# Patient Record
Sex: FEMALE | Race: WHITE | NOT HISPANIC OR LATINO | Employment: OTHER | URBAN - NONMETROPOLITAN AREA
[De-identification: names, ages, dates, MRNs, and addresses within clinical notes are randomized per-mention and may not be internally consistent; named-entity substitution may affect disease eponyms.]

---

## 2019-11-27 ENCOUNTER — APPOINTMENT (INPATIENT)
Dept: NON INVASIVE DIAGNOSTICS | Facility: HOSPITAL | Age: 64
DRG: 064 | End: 2019-11-27
Payer: MEDICARE

## 2019-11-27 ENCOUNTER — APPOINTMENT (INPATIENT)
Dept: ULTRASOUND IMAGING | Facility: HOSPITAL | Age: 64
DRG: 064 | End: 2019-11-27
Payer: MEDICARE

## 2019-11-27 ENCOUNTER — APPOINTMENT (INPATIENT)
Dept: MRI IMAGING | Facility: HOSPITAL | Age: 64
DRG: 064 | End: 2019-11-27
Payer: MEDICARE

## 2019-11-27 ENCOUNTER — APPOINTMENT (EMERGENCY)
Dept: CT IMAGING | Facility: HOSPITAL | Age: 64
DRG: 064 | End: 2019-11-27
Payer: MEDICARE

## 2019-11-27 ENCOUNTER — APPOINTMENT (EMERGENCY)
Dept: RADIOLOGY | Facility: HOSPITAL | Age: 64
DRG: 064 | End: 2019-11-27
Payer: MEDICARE

## 2019-11-27 ENCOUNTER — HOSPITAL ENCOUNTER (INPATIENT)
Facility: HOSPITAL | Age: 64
LOS: 3 days | DRG: 064 | End: 2019-11-30
Attending: EMERGENCY MEDICINE | Admitting: INTERNAL MEDICINE
Payer: MEDICARE

## 2019-11-27 ENCOUNTER — APPOINTMENT (INPATIENT)
Dept: CT IMAGING | Facility: HOSPITAL | Age: 64
DRG: 064 | End: 2019-11-27
Payer: MEDICARE

## 2019-11-27 DIAGNOSIS — I63.9 CVA (CEREBRAL VASCULAR ACCIDENT) (HCC): Primary | ICD-10-CM

## 2019-11-27 DIAGNOSIS — F17.200 SMOKER: ICD-10-CM

## 2019-11-27 DIAGNOSIS — C79.9 MULTIPLE LESIONS OF METASTATIC MALIGNANCY (HCC): ICD-10-CM

## 2019-11-27 DIAGNOSIS — M62.82 RHABDOMYOLYSIS: ICD-10-CM

## 2019-11-27 DIAGNOSIS — N17.9 ACUTE RENAL FAILURE (ARF) (HCC): ICD-10-CM

## 2019-11-27 PROBLEM — R09.02 HYPOXIA: Status: ACTIVE | Noted: 2019-11-27

## 2019-11-27 PROBLEM — R74.01 ELEVATED TRANSAMINASE LEVEL: Status: ACTIVE | Noted: 2019-11-27

## 2019-11-27 LAB
ALBUMIN SERPL BCP-MCNC: 3.5 G/DL (ref 3.5–5)
ALP SERPL-CCNC: 184 U/L (ref 46–116)
ALT SERPL W P-5'-P-CCNC: 2733 U/L (ref 12–78)
ANION GAP SERPL CALCULATED.3IONS-SCNC: 13 MMOL/L (ref 4–13)
APTT PPP: 32 SECONDS (ref 23–37)
AST SERPL W P-5'-P-CCNC: 7506 U/L (ref 5–45)
ATRIAL RATE: 111 BPM
BASOPHILS # BLD MANUAL: 0 THOUSAND/UL (ref 0–0.1)
BASOPHILS NFR MAR MANUAL: 0 % (ref 0–1)
BILIRUB SERPL-MCNC: 2.17 MG/DL (ref 0.2–1)
BUN SERPL-MCNC: 73 MG/DL (ref 5–25)
CALCIUM SERPL-MCNC: 7.9 MG/DL (ref 8.3–10.1)
CHLORIDE SERPL-SCNC: 102 MMOL/L (ref 100–108)
CK MB SERPL-MCNC: 1.3 % (ref 0–2.5)
CK MB SERPL-MCNC: 67 NG/ML (ref 0–5)
CK SERPL-CCNC: 5165 U/L (ref 26–192)
CO2 SERPL-SCNC: 22 MMOL/L (ref 21–32)
CREAT SERPL-MCNC: 1.69 MG/DL (ref 0.6–1.3)
EOSINOPHIL # BLD MANUAL: 0 THOUSAND/UL (ref 0–0.4)
EOSINOPHIL NFR BLD MANUAL: 0 % (ref 0–6)
ERYTHROCYTE [DISTWIDTH] IN BLOOD BY AUTOMATED COUNT: 13.6 % (ref 11.6–15.1)
GFR SERPL CREATININE-BSD FRML MDRD: 31 ML/MIN/1.73SQ M
GIANT PLATELETS BLD QL SMEAR: PRESENT
GLUCOSE SERPL-MCNC: 123 MG/DL (ref 65–140)
GLUCOSE SERPL-MCNC: 228 MG/DL (ref 65–140)
GLUCOSE SERPL-MCNC: 315 MG/DL (ref 65–140)
GLUCOSE SERPL-MCNC: 352 MG/DL (ref 65–140)
GLUCOSE SERPL-MCNC: <20 MG/DL (ref 65–140)
GLUCOSE SERPL-MCNC: >500 MG/DL (ref 65–140)
GLUCOSE SERPL-MCNC: >500 MG/DL (ref 65–140)
HCT VFR BLD AUTO: 35.3 % (ref 34.8–46.1)
HGB BLD-MCNC: 11 G/DL (ref 11.5–15.4)
INR PPP: 2.7 (ref 0.84–1.19)
LACTATE SERPL-SCNC: 2 MMOL/L (ref 0.5–2)
LACTATE SERPL-SCNC: 3.5 MMOL/L (ref 0.5–2)
LG PLATELETS BLD QL SMEAR: PRESENT
LYMPHOCYTES # BLD AUTO: 1.26 THOUSAND/UL (ref 0.6–4.47)
LYMPHOCYTES # BLD AUTO: 9 % (ref 14–44)
MCH RBC QN AUTO: 30.2 PG (ref 26.8–34.3)
MCHC RBC AUTO-ENTMCNC: 31.2 G/DL (ref 31.4–37.4)
MCV RBC AUTO: 97 FL (ref 82–98)
MONOCYTES # BLD AUTO: 0.42 THOUSAND/UL (ref 0–1.22)
MONOCYTES NFR BLD: 3 % (ref 4–12)
NEUTROPHILS # BLD MANUAL: 12.33 THOUSAND/UL (ref 1.85–7.62)
NEUTS BAND NFR BLD MANUAL: 6 % (ref 0–8)
NEUTS SEG NFR BLD AUTO: 82 % (ref 43–75)
NRBC BLD AUTO-RTO: 0 /100 WBCS
P AXIS: 69 DEGREES
PHOSPHATE SERPL-MCNC: 3.3 MG/DL (ref 2.3–4.1)
PLATELET # BLD AUTO: 49 THOUSANDS/UL (ref 149–390)
PLATELET # BLD AUTO: 62 THOUSANDS/UL (ref 149–390)
PLATELET BLD QL SMEAR: ABNORMAL
PMV BLD AUTO: 13.3 FL (ref 8.9–12.7)
POIKILOCYTOSIS BLD QL SMEAR: PRESENT
POLYCHROMASIA BLD QL SMEAR: PRESENT
POTASSIUM SERPL-SCNC: 5.1 MMOL/L (ref 3.5–5.3)
PR INTERVAL: 130 MS
PROT SERPL-MCNC: 6.7 G/DL (ref 6.4–8.2)
PROTHROMBIN TIME: 29.1 SECONDS (ref 11.6–14.5)
QRS AXIS: 24 DEGREES
QRSD INTERVAL: 80 MS
QT INTERVAL: 338 MS
QTC INTERVAL: 459 MS
RBC # BLD AUTO: 3.64 MILLION/UL (ref 3.81–5.12)
SODIUM SERPL-SCNC: 137 MMOL/L (ref 136–145)
T WAVE AXIS: 3 DEGREES
TOTAL CELLS COUNTED SPEC: 100
VENTRICULAR RATE: 111 BPM
WBC # BLD AUTO: 14.01 THOUSAND/UL (ref 4.31–10.16)

## 2019-11-27 PROCEDURE — 82550 ASSAY OF CK (CPK): CPT | Performed by: EMERGENCY MEDICINE

## 2019-11-27 PROCEDURE — 36415 COLL VENOUS BLD VENIPUNCTURE: CPT | Performed by: EMERGENCY MEDICINE

## 2019-11-27 PROCEDURE — 71045 X-RAY EXAM CHEST 1 VIEW: CPT

## 2019-11-27 PROCEDURE — 82948 REAGENT STRIP/BLOOD GLUCOSE: CPT

## 2019-11-27 PROCEDURE — 83605 ASSAY OF LACTIC ACID: CPT | Performed by: INTERNAL MEDICINE

## 2019-11-27 PROCEDURE — 92610 EVALUATE SWALLOWING FUNCTION: CPT

## 2019-11-27 PROCEDURE — 93005 ELECTROCARDIOGRAM TRACING: CPT

## 2019-11-27 PROCEDURE — 70551 MRI BRAIN STEM W/O DYE: CPT

## 2019-11-27 PROCEDURE — 99291 CRITICAL CARE FIRST HOUR: CPT | Performed by: EMERGENCY MEDICINE

## 2019-11-27 PROCEDURE — 96360 HYDRATION IV INFUSION INIT: CPT

## 2019-11-27 PROCEDURE — 85027 COMPLETE CBC AUTOMATED: CPT | Performed by: EMERGENCY MEDICINE

## 2019-11-27 PROCEDURE — 84100 ASSAY OF PHOSPHORUS: CPT | Performed by: INTERNAL MEDICINE

## 2019-11-27 PROCEDURE — 85049 AUTOMATED PLATELET COUNT: CPT | Performed by: INTERNAL MEDICINE

## 2019-11-27 PROCEDURE — 99223 1ST HOSP IP/OBS HIGH 75: CPT | Performed by: INTERNAL MEDICINE

## 2019-11-27 PROCEDURE — 83605 ASSAY OF LACTIC ACID: CPT | Performed by: EMERGENCY MEDICINE

## 2019-11-27 PROCEDURE — 85730 THROMBOPLASTIN TIME PARTIAL: CPT | Performed by: EMERGENCY MEDICINE

## 2019-11-27 PROCEDURE — 70544 MR ANGIOGRAPHY HEAD W/O DYE: CPT

## 2019-11-27 PROCEDURE — 82553 CREATINE MB FRACTION: CPT | Performed by: EMERGENCY MEDICINE

## 2019-11-27 PROCEDURE — 99285 EMERGENCY DEPT VISIT HI MDM: CPT

## 2019-11-27 PROCEDURE — 85007 BL SMEAR W/DIFF WBC COUNT: CPT | Performed by: EMERGENCY MEDICINE

## 2019-11-27 PROCEDURE — 71250 CT THORAX DX C-: CPT

## 2019-11-27 PROCEDURE — G0425 INPT/ED TELECONSULT30: HCPCS | Performed by: PSYCHIATRY & NEUROLOGY

## 2019-11-27 PROCEDURE — 85610 PROTHROMBIN TIME: CPT | Performed by: EMERGENCY MEDICINE

## 2019-11-27 PROCEDURE — 80053 COMPREHEN METABOLIC PANEL: CPT | Performed by: EMERGENCY MEDICINE

## 2019-11-27 PROCEDURE — 70450 CT HEAD/BRAIN W/O DYE: CPT

## 2019-11-27 PROCEDURE — 76705 ECHO EXAM OF ABDOMEN: CPT

## 2019-11-27 RX ORDER — ATORVASTATIN CALCIUM 40 MG/1
40 TABLET, FILM COATED ORAL EVERY EVENING
Status: DISCONTINUED | OUTPATIENT
Start: 2019-11-27 | End: 2019-11-29

## 2019-11-27 RX ORDER — CEFTRIAXONE 1 G/50ML
1000 INJECTION, SOLUTION INTRAVENOUS EVERY 24 HOURS
Status: DISCONTINUED | OUTPATIENT
Start: 2019-11-27 | End: 2019-11-29

## 2019-11-27 RX ORDER — NICOTINE 21 MG/24HR
1 PATCH, TRANSDERMAL 24 HOURS TRANSDERMAL DAILY
Status: DISCONTINUED | OUTPATIENT
Start: 2019-11-27 | End: 2019-11-30 | Stop reason: HOSPADM

## 2019-11-27 RX ORDER — DEXTROSE 50 % IN WATER 50 %
1 SYRINGE (ML) INTRAVENOUS ONCE
Status: COMPLETED | OUTPATIENT
Start: 2019-11-27 | End: 2019-11-27

## 2019-11-27 RX ORDER — ASPIRIN 81 MG/1
81 TABLET, CHEWABLE ORAL DAILY
Status: DISCONTINUED | OUTPATIENT
Start: 2019-11-27 | End: 2019-11-28

## 2019-11-27 RX ORDER — INSULIN GLARGINE 100 [IU]/ML
10 INJECTION, SOLUTION SUBCUTANEOUS
Status: DISCONTINUED | OUTPATIENT
Start: 2019-11-27 | End: 2019-11-27

## 2019-11-27 RX ORDER — SODIUM CHLORIDE 9 MG/ML
50 INJECTION, SOLUTION INTRAVENOUS CONTINUOUS
Status: DISCONTINUED | OUTPATIENT
Start: 2019-11-27 | End: 2019-11-28

## 2019-11-27 RX ADMIN — INSULIN LISPRO 2 UNITS: 100 INJECTION, SOLUTION INTRAVENOUS; SUBCUTANEOUS at 18:16

## 2019-11-27 RX ADMIN — SODIUM CHLORIDE 1000 ML: 0.9 INJECTION, SOLUTION INTRAVENOUS at 09:35

## 2019-11-27 RX ADMIN — CEFTRIAXONE 1000 MG: 1 INJECTION, SOLUTION INTRAVENOUS at 18:10

## 2019-11-27 RX ADMIN — ENOXAPARIN SODIUM 40 MG: 40 INJECTION SUBCUTANEOUS at 15:15

## 2019-11-27 RX ADMIN — SODIUM CHLORIDE 125 ML/HR: 0.9 INJECTION, SOLUTION INTRAVENOUS at 17:30

## 2019-11-27 NOTE — ASSESSMENT & PLAN NOTE
Patient on the ground for unknown duration  Follow CK level, serum electrolytes, renal function  Continue normal saline at 125 ml/hr

## 2019-11-27 NOTE — ED PROVIDER NOTES
History  Chief Complaint   Patient presents with    Altered Mental Status     pt found sitting on br floor juat PTA, garbled speech, left facial droop unequal strength hand grasp, weak on left, right sided gaze, incont urine  Pt has hx of cold and cyanotic hands and feet all the time  last seen well on Monday am, today is Wed  Patient was last known normal Sunday night per boyfriend  Boyfriend found her today slumped over on the toilet  Noted to have a facial droop questionable right-sided gaze and left-sided weakness  Blood sugar was read as 23 was given D50  Slightly improved mental status although the facial droop and left-sided weakness continued  Patient unable to give a great history  Patient's boyfriend is now here  He states that the patient complained not feeling well during the weekend  Was tired and cold symptoms  Last seen normal was Monday morning  10AM:       History provided by:  EMS personnel  History limited by:  Acuity of condition   used: No    Altered Mental Status   Presenting symptoms: confusion, lethargy and partial responsiveness    Severity:  Moderate  Most recent episode:  More than 2 days ago  Episode history:  Single  Timing:  Constant  Progression:  Waxing and waning  Associated symptoms: no abdominal pain, no fever, no hallucinations, no headaches, no nausea, no palpitations, no rash, no seizures and no vomiting        Prior to Admission Medications   Prescriptions Last Dose Informant Patient Reported? Taking? Multiple Vitamin (MULTIVITAMIN) tablet 11/26/2019 at Unknown time  Yes Yes   Sig: Take 1 tablet by mouth daily  aspirin (ECOTRIN) 325 mg EC tablet 11/26/2019 at Unknown time  Yes Yes   Sig: Take 325 mg by mouth daily  Facility-Administered Medications: None       History reviewed  No pertinent past medical history  History reviewed  No pertinent surgical history  History reviewed  No pertinent family history    I have reviewed and agree with the history as documented  Social History     Tobacco Use    Smoking status: Current Every Day Smoker    Smokeless tobacco: Never Used   Substance Use Topics    Alcohol use: Yes     Comment: ocassional    Drug use: No        Review of Systems   Constitutional: Negative for chills and fever  HENT: Negative for ear pain, hearing loss, sore throat, trouble swallowing and voice change  Eyes: Negative for pain and discharge  Respiratory: Negative for cough, shortness of breath and wheezing  Cardiovascular: Negative for chest pain and palpitations  Gastrointestinal: Negative for abdominal pain, blood in stool, constipation, diarrhea, nausea and vomiting  Genitourinary: Negative for dysuria, flank pain, frequency and hematuria  Musculoskeletal: Negative for joint swelling, neck pain and neck stiffness  Skin: Negative for rash and wound  Neurological: Negative for dizziness, seizures, syncope, facial asymmetry and headaches  Psychiatric/Behavioral: Positive for confusion  Negative for hallucinations, self-injury and suicidal ideas  All other systems reviewed and are negative  Physical Exam  Physical Exam   Constitutional: She appears well-developed and well-nourished  No distress  HENT:   Head: Normocephalic and atraumatic  Right Ear: External ear normal    Left Ear: External ear normal    Eyes: Pupils are equal, round, and reactive to light  Conjunctivae are normal    Eye slightly deviated to the right  Neck: Normal range of motion  Neck supple  Cardiovascular: Normal rate, regular rhythm and normal heart sounds  No murmur heard  Pulmonary/Chest: Effort normal and breath sounds normal  She has no wheezes  She has no rales  Abdominal: Soft  Bowel sounds are normal  She exhibits no distension  There is no tenderness  There is no rebound and no guarding  Musculoskeletal: Normal range of motion  She exhibits no deformity  Neurological: She is alert     Response to tactile stimuli  Not really answering questions  Left facial droop noted  Eyes slightly deviated to the right  Moves left side but does not follow commands  Does appear weak on the left compared to the right  Skin: Skin is warm and dry  No rash noted  Psychiatric: She has a normal mood and affect  Her behavior is normal    Nursing note and vitals reviewed  Vital Signs  ED Triage Vitals [11/27/19 0925]   Temperature Pulse Respirations Blood Pressure SpO2   98 4 °F (36 9 °C) (!) 111 (!) 24 148/84 (!) 68 %      Temp Source Heart Rate Source Patient Position - Orthostatic VS BP Location FiO2 (%)   Temporal Monitor Lying Right arm --      Pain Score       No Pain           Vitals:    11/27/19 1015 11/27/19 1030 11/27/19 1045 11/27/19 1100   BP:  148/93     Pulse: (!) 111 105 105 (!) 109   Patient Position - Orthostatic VS:             Visual Acuity  Visual Acuity      Most Recent Value   L Pupil Size (mm)  3   R Pupil Size (mm)  3   L Pupil Shape  Round   R Pupil Shape  Round          ED Medications  Medications   sodium chloride 0 9 % bolus 1,000 mL (1,000 mL Intravenous New Bag 11/27/19 0935)   dextrose (FOR EMS ONLY) 50 % IV solution 100 mL (0 mL Does not apply Given to EMS 11/27/19 0944)       Diagnostic Studies  Results Reviewed     Procedure Component Value Units Date/Time    CK Total with Reflex CKMB [[id number]]  (Abnormal) Collected:  11/27/19 0938    Lab Status:  Final result Specimen:  Blood from Arm, Right Updated:  11/27/19 1051     Total CK 5,165 U/L     CKMB [[id number]] Collected:  11/27/19 0938    Lab Status: In process Specimen:  Blood from Arm, Right Updated:  11/27/19 1051    Lactic acid, plasma [[id number]]  (Abnormal) Collected:  11/27/19 0938    Lab Status:  Final result Specimen:  Blood from Arm, Right Updated:  11/27/19 1038     LACTIC ACID 3 5 mmol/L     Narrative:       Result may be elevated if tourniquet was used during collection      CBC and differential [[id number]]  (Abnormal) Collected:  11/27/19 0938    Lab Status:  Final result Specimen:  Blood from Arm, Right Updated:  11/27/19 1031     WBC 14 01 Thousand/uL      RBC 3 64 Million/uL      Hemoglobin 11 0 g/dL      Hematocrit 35 3 %      MCV 97 fL      MCH 30 2 pg      MCHC 31 2 g/dL      RDW 13 6 %      MPV 13 3 fL      Platelets 49 Thousands/uL      nRBC 0 /100 WBCs     Protime-INR [[id number]]  (Abnormal) Collected:  11/27/19 0938    Lab Status:  Final result Specimen:  Blood from Arm, Right Updated:  11/27/19 1008     Protime 29 1 seconds      INR 2 70    APTT [[id number]]  (Normal) Collected:  11/27/19 0938    Lab Status:  Final result Specimen:  Blood from Arm, Right Updated:  11/27/19 1008     PTT 32 seconds     Comprehensive metabolic panel [[id number]] Collected:  11/27/19 0938    Lab Status: In process Specimen:  Blood from Arm, Right Updated:  11/27/19 0946    Fingerstick Glucose (POCT) [[id number]]  (Abnormal) Collected:  11/27/19 0934    Lab Status:  Final result Updated:  11/27/19 0935     POC Glucose 315 mg/dl     Fingerstick Glucose (POCT) [[id number]]  (Abnormal) Collected:  11/27/19 0921    Lab Status:  Final result Updated:  11/27/19 0935     POC Glucose >500 mg/dl     Rapid drug screen, urine [[id number]]     Lab Status:  No result Specimen:  Urine     UA w Reflex to Microscopic w Reflex to Culture [[id number]]     Lab Status:  No result Specimen:  Urine, Clean Catch                  XR chest 1 view portable   Final Result by Lilliam Parson MD (11/27 1032)      No acute cardiopulmonary disease  Workstation performed: [id number]         CT head without contrast   Final Result by Aria Reed MD (11/27 1003)      Evolving right MCA and PCA territory infarcts                     I personally discussed this study with Jhonathan Cunningham on 11/27/2019 at 10:03 AM                Workstation performed: [id number]                    Procedures  ECG 12 Lead Documentation Only  Date/Time: 11/27/2019 9:34 AM  Performed by: Doron Hoang Serina Valle MD  Authorized by: Miguel Angel Herron MD     ECG reviewed by me, the ED Provider: yes    Previous ECG:     Previous ECG:  Unavailable  Rate:     ECG rate:  110  Rhythm:     Rhythm: sinus rhythm    Ectopy:     Ectopy: none    QRS:     QRS axis:  Right    CriticalCare Time  Performed by: Miguel Angel Herron MD  Authorized by: Miguel Angel Herron MD     Critical care provider statement:     Critical care time (minutes):  35    Critical care was necessary to treat or prevent imminent or life-threatening deterioration of the following conditions:  CNS failure or compromise, dehydration and renal failure    Critical care was time spent personally by me on the following activities:  Ordering and performing treatments and interventions, ordering and review of laboratory studies, ordering and review of radiographic studies, discussions with consultants and re-evaluation of patient's condition           ED Course  ED Course as of Nov 27 1111   Wed Nov 27, 2019   1021 Discussed with neurologist on-call  Will do tele Neuro today  States the patient can stay here  Stroke Assessment     Row Name 11/27/19 0954             NIH Stroke Scale    Interval  --      Level of Consciousness (1a )  1      LOC Questions (1b )  2      LOC Commands (1c )  0      Best Gaze (2 )  1      Visual (3 )  0      Facial Palsy (4 )  2      Motor Arm, Left (5a )  2      Motor Arm, Right (5b )  0      Motor Leg, Left (6a )  2      Motor Leg, Right (6b )  0      Limb Ataxia (7 )  0      Sensory (8 )  0      Best Language (9 )  1      Dysarthria (10 )  1      Extinction and Inattention (11 ) (Formerly Neglect)  2      Total  14          First Filed Value   TPA Decision  Patient not a TPA candidate  Patient is not a candidate options  Unclear time of onset outside appropriate time window                          MDM    Disposition  Final diagnoses:   CVA (cerebral vascular accident) ([facility] [address] )   Rhabdomyolysis     Time reflects when diagnosis was documented in both MDM as applicable and the Disposition within this note     Time User Action Codes Description Comment    11/27/2019 10:05 AM Jessica Hartmann Add [I63 9] CVA (cerebral vascular accident) ([facility] [address] )     11/27/2019 11:10 AM Jessica Hartmann Add [M62 82] Rhabdomyolysis       ED Disposition     ED Disposition Condition Date/Time Comment    Admit Stable Wed Nov 27, 2019 10:22 AM Case was discussed with Safia Campo and the patient's admission status was agreed to be to the service of Dr Loretta Jeronimo  Follow-up Information    None         Patient's Medications   Discharge Prescriptions    No medications on file     No discharge procedures on file      ED Provider  Electronically Signed by           Chas Pisano MD  11/27/19 8697

## 2019-11-27 NOTE — ED NOTES
Pt family now states that pt hands and feet are NOT normally blue and cold       [address] Y[address]ira RN  11/27/19 1240

## 2019-11-27 NOTE — ED NOTES
Unable to take pt up to ICU due to HOLD until they call for pt       [address] Y[address]ira RN  11/27/19 3247

## 2019-11-27 NOTE — H&P
H&P- Tawny Bernard Domi 1955, 75 y o  female MRN: [id number]    Unit/Bed#: -01 Encounter: [id number]    Primary Care Provider: No primary care provider on file  Date and time admitted to hospital: 11/27/2019  9:18 AM      * Ischemic stroke [facility])  Assessment & Plan  Admitted for ischemic stroke after patient found on the ground with weakness   MCA and PCA territory ischemia on CT, rule out embolic stroke  Continue aspirin and statin, permissive hypertension and Keep blood pressure less than 220/120  DVT prophylaxis with 40 mg and Lovenox  Follow MRI, echocardiogram, telemetry  Puree Diet with honey thickened liquid, speech swallow study,  PTOT eval,  consult    Elevated transaminase level  Assessment & Plan  Elevated AST, ALT and bilirubin  Nonspecific  Check right upper quadrant ultrasound  U tox level    Hypoxia  Assessment & Plan  Hypoxia associated with bilateral hand and feet cyanosis  O2 nasal cannula, keep saturation greater than 95 percent    Acute renal failure (ARF) (HCC)  Assessment & Plan  CHRISTELLE associated with rhabdomyolysis  Continue IV hydration  Monitor urine output, serum electrolytes, renal function daily  Nephrology consult    Rhabdomyolysis  Assessment & Plan  Patient on the ground for unknown duration  Follow CK level, serum electrolytes, renal function  Continue normal saline at 125 ml/hr      VTE Prophylaxis: Enoxaparin (Lovenox)  / sequential compression device   Code Status:  Full code  POLST: There is no POLST form on file for this patient (pre-hospital)    Anticipated Length of Stay:  Patient will be admitted on an Inpatient basis with an anticipated length of stay of  greater than 2 midnights  Justification for Hospital Stay:  Ischemic stroke    Total Time for Visit, including Counseling / Coordination of Care: 45 minutes  Greater than 50% of this total time spent on direct patient counseling and coordination of care  History of Present Illness:     Tawny Bernard Beatriz Sanches is a 75 y o  female who presents with left-sided body weakness and facial droop  Her sister who calls her every day was unable to communicate with the patient for more than 2 days and was found on the ground in her house today with left-sided body weakness and facial droop  When they found her this morning her extremities where blue specially on the lower extremity  Past medical history is on as patient does not follow-up with any in physician  Patient takes aspirin and multivitamin daily  In ER patient was found to have left-sided and facial weakness, saturating to lower 60s on room air, bilateral hand and feet cyanosis  Patient admitted with stroke has been  Neurology consult appreciated  Review of Systems:    Review of Systems    Past Medical and Surgical History:     Past Medical History:   Diagnosis Date    Acute renal failure (ARF) ([facility] [address] ) 11/27/2019    Arthritis        History reviewed  No pertinent surgical history  Meds/Allergies:    Prior to Admission medications    Medication Sig Start Date End Date Taking? Authorizing Provider   aspirin (ECOTRIN) 325 mg EC tablet Take 325 mg by mouth daily  Yes Historical Provider, MD   Multiple Vitamin (MULTIVITAMIN) tablet Take 1 tablet by mouth daily  Yes Historical Provider, MD     I have reviewed home medications with patient personally      Allergies: No Known Allergies    Social History:     Substance Use History:   Social History     Substance and Sexual Activity   Alcohol Use Yes    Alcohol/week: 1 0 standard drinks    Types: 1 Shots of liquor per week    Frequency: 2-3 times a week    Drinks per session: 1 or 2    Binge frequency: Never    Comment: ocassional     Social History     Tobacco Use   Smoking Status Current Every Day Smoker    Packs/day: 0 50    Years: 40 00    Pack years: 20 00    Types: Cigarettes   Smokeless Tobacco Never Used     Social History     Substance and Sexual Activity   Drug Use No       Family History:    non-contributory    Physical Exam:     Vitals:   Blood Pressure: 130/87 (11/27/19 1300)  Pulse: (!) 106 (11/27/19 1330)  Temperature: 97 6 °F (36 4 °C) (11/27/19 1211)  Temp Source: Temporal (11/27/19 1211)  Respirations: (!) 28 (11/27/19 1330)  Weight - Scale: 50 4 kg (111 lb 1 8 oz) (11/27/19 0925)  SpO2: 98 % (11/27/19 1330)    Physical Exam    General appearance: alert  Skin: Bilateral feet and hand cyanosis  Head: Normocephalic, without obvious abnormality, atraumatic  Eyes: conjunctivae/corneas clear  PERRL, EOM's intact  Lungs: clear to auscultation bilaterally  Heart: regular rate and rhythm, S1, S2 normal, no murmur, click, rub or gallop  Abdomen: soft, non-tender; bowel sounds normal; no masses,  no organomegaly  Back: symmetric, no curvature  ROM normal  No CVA tenderness  Extremities: Bilateral toe and finger tip cyanosis, radial artery possible  Neurologic: Mental status: alertness: alert, orientation: Oriented to self, disoriented to place and time  Power is 5/5 on the right side, 4/5 on the left side  Left-sided lower facial deviation  Sensory intact  Gait not assessed, Gait  Psychiatric:  Rambling words, circumferential    Additional Data:     Lab Results: I have personally reviewed pertinent reports        Results from last 7 days   Lab Units 11/27/19  0938   WBC Thousand/uL 14 01*   HEMOGLOBIN g/dL 11 0*   HEMATOCRIT % 35 3   PLATELETS Thousands/uL 49*   BANDS PCT % 6   LYMPHO PCT % 9*   MONO PCT % 3*   EOS PCT % 0     Results from last 7 days   Lab Units 11/27/19  0938   SODIUM mmol/L 137   POTASSIUM mmol/L 5 1   CHLORIDE mmol/L 102   CO2 mmol/L 22   BUN mg/dL 73*   CREATININE mg/dL 1 69*   ANION GAP mmol/L 13   CALCIUM mg/dL 7 9*   ALBUMIN g/dL 3 5   TOTAL BILIRUBIN mg/dL 2 17*   ALK PHOS U/L 184*   ALT U/L 2,733*   AST U/L 7,506*   GLUCOSE RANDOM mg/dL 352*     Results from last 7 days   Lab Units 11/27/19  0938   INR  2 70*     Results from last 7 days   Lab Units 11/27/19  0934 11/27/19  0921   POC GLUCOSE mg/dl 315* >500*         Results from last 7 days   Lab Units 11/27/19  0938   LACTIC ACID mmol/L 3 5*       Imaging: I have personally reviewed pertinent reports  XR chest 1 view portable   Final Result by Celine Murphy MD (11/27 1032)      No acute cardiopulmonary disease  Workstation performed: [id number]         CT head without contrast   Final Result by Fabrice Lofton MD (11/27 1003)      Evolving right MCA and PCA territory infarcts  I personally discussed this study with Sherif Luis on 11/27/2019 at 10:03 AM                Workstation performed: [id number]         CTA head and neck w wo contrast    (Results Pending)   MRI brain wo contrast    (Results Pending)       EKG, Pathology, and Other Studies Reviewed on Admission: yes    Allscripts / Epic Records Reviewed: Yes     ** Please Note: This note has been constructed using a voice recognition system   **

## 2019-11-27 NOTE — TELEMEDICINE
TeleConsultation - Neurology   Haider Duron 75 y o  female MRN: [id number]  Unit/Bed#: DAVID Encounter: [id number]      REQUIRED DOCUMENTATION:     1  This service was provided via Telemedicine  2  Provider located at [facility], [place], [place]  3  TeleMed provider: Celine Summers MD   4  Identify all parties in room with patient during tele consult: none  5  After connecting through televideo, patient was identified by name and date of birth and assistant checked wristband  Patient was then informed that this was a Telemedicine visit and that the exam was being conducted confidentially over secure lines  My office door was closed  No one else was in the room  Patient acknowledged consent and understanding of privacy and security of the Telemedicine visit, and gave us permission to have the assistant stay in the room in order to assist with the history and to conduct the exam   I informed the patient that I have reviewed their record in Epic and presented the opportunity for them to ask any questions regarding the visit today  The patient agreed to participate  Assessment/Plan   Assessment:  L sided weakness, slurred speech, and right gaze deviation, or right gaze preference, symptoms started Monday  No a tPA candidate and not a mechanical thrombectomy candidate given  CT head which I personally reviewed the images which showed R PCA and R MCA stroke  Etiology unclear at this time, cardioembolic vs atherosclerotic disease  Plan:  -recommend stroke workup  -recommend MRI brain, echocardiogram  -continue with aspirin for now  -since it is a medium sized stroke, hold off on starting plavix in addition to aspirin  -BP Goal <220/120  -neurochecks    Neurology will follow  History of Present Illness     Reason for Consult / Principal Problem: left sided weakness     Hx and PE limited by: none  HPI: Luis Foster is a 75 y o  right handed female who presents with altered mental status, and lethargy  Her boyfriend called  EMS came  She was found sitting on the bathroom floor and need to get evaluated  Last known well was Monday, and her boyfriend found her this AM and got her sitting up  She had facial droop and slurred speech  She has the R gaze deviation and her left face twitching  She has some L face twitching when she gets nervous and this has been going on for sevearl years  She denies any numbness, and no shortness of breath, no     Consults    Review of Systems   Constitutional: Negative  HENT: Negative  Eyes: Negative  Respiratory: Negative  Cardiovascular: Negative  Gastrointestinal: Negative  Endocrine: Negative  Genitourinary: Negative  Musculoskeletal: Negative  Skin: Negative  Allergic/Immunologic: Negative  Neurological: Negative  Hematological: Negative  Psychiatric/Behavioral: Negative  All other systems reviewed and are negative  All review of systems negative except mentioned above in HPI  Historical Information   History reviewed  No pertinent past medical history  History reviewed  No pertinent surgical history  Social History   Social History     Substance and Sexual Activity   Alcohol Use Yes    Comment: ocassional     Social History     Substance and Sexual Activity   Drug Use No     Social History     Tobacco Use   Smoking Status Current Every Day Smoker   Smokeless Tobacco Never Used     Family History: non-contributory    Review of previous medical records was done and completed  Meds/Allergies   current meds:   No current facility-administered medications for this encounter  and PTA meds:   Prior to Admission Medications   Prescriptions Last Dose Informant Patient Reported? Taking? Multiple Vitamin (MULTIVITAMIN) tablet 11/26/2019 at Unknown time  Yes Yes   Sig: Take 1 tablet by mouth daily  aspirin (ECOTRIN) 325 mg EC tablet 11/26/2019 at Unknown time  Yes Yes   Sig: Take 325 mg by mouth daily  Facility-Administered Medications: None       No Known Allergies    Objective   Vitals:Blood pressure 130/87, pulse (!) 106, temperature 97 6 °F (36 4 °C), temperature source Temporal, resp  rate (!) 28, weight 50 4 kg (111 lb 1 8 oz), SpO2 98 %  ,Body mass index is 20 32 kg/m²  Intake/Output Summary (Last 24 hours) at 11/27/2019 1353  Last data filed at 11/27/2019 1351  Gross per 24 hour   Intake 2000 ml   Output --   Net 2000 ml       Invasive Devices: Invasive Devices     Peripheral Intravenous Line            Peripheral IV 11/27/19 Left Antecubital less than 1 day    Peripheral IV 11/27/19 Left Hand less than 1 day    Peripheral IV 11/27/19 Right Hand less than 1 day                Physical Exam   General: Patient is not in any acute/apparent distress, well nourished, well developed and cooperative  HEENT: normocephalic, atraumatic, moist membranes  Neck: supple  Heart: regular heart rate and rhythm, no murmurs, rubs and or gallops  Chest: Clear to auscultation bilaterally  Abdomen: soft and non-tender  Extremities: no edema noted   Skin: no lesions or rash  Musculosketal: no bony abnormalities    Neurologic Examination:   Mental status: alert, awake, oriented X 3 and following commands  Speech/Language: Speech is fluent without any dysarthria, no aphasia noted, can name, repeat, read and write and comprehension intact    Cranial Nerves:   CN I: smell not tested  CN II: Visual fields full to confrontation, fundus - no papilledema noted  CN III, IV, VI: Extraocular movements intact bilaterally  Pupils equal round and reactive to light bilaterally  CN V: Facial sensation is normal   CN VII: Full and symmetric facial movement  CN VIII: Hearing is normal   CN IX, X: Palate elevates symmetrically  Normal gag reflex  CN XI: Shoulder shrug strength is normal   CN XII: Tongue midline without atrophy or fasciculations      Motor:   L sided weakness - pronator drift noted in LUE and weakness 4-/5 on LLE, 5/5 on the R     Sensory:   Sensation intact to soft touch, could not check vibration and proprioception because RN checking exam    Cerebellar:   Ataxic on the L side  Reflexes: 2+ in all 4 extremities  Pathologic reflexes - babinski reflex negative, Hoffmans reflex - negative    Gait:   deferred    Lab Results:   I have personally reviewed pertinent reports    , CBC:   Results from last 7 days   Lab Units 11/27/19  0938   WBC Thousand/uL 14 01*   RBC Million/uL 3 64*   HEMOGLOBIN g/dL 11 0*   HEMATOCRIT % 35 3   MCV fL 97   PLATELETS Thousands/uL 49*   , BMP/CMP:   Results from last 7 days   Lab Units 11/27/19  0938   SODIUM mmol/L 137   POTASSIUM mmol/L 5 1   CHLORIDE mmol/L 102   CO2 mmol/L 22   BUN mg/dL 73*   CREATININE mg/dL 1 69*   CALCIUM mg/dL 7 9*   AST U/L 7,506*   ALT U/L 2,733*   ALK PHOS U/L 184*   EGFR ml/min/1 73sq m 31   , Vitamin B12:   , HgBA1C:   , TSH:   , Coagulation:   Results from last 7 days   Lab Units 11/27/19  0938   INR  2 70*   , Lipid Profile:   , Ammonia:   , Urinalysis:       Invalid input(s): URIBILINOGEN, Drug Screen:   , Medication Drug Levels:       Invalid input(s): CARBAMAZEPINE,  PHENOBARB, LACOSAMIDE, OXCARBAZEPINE  Imaging Studies: I have personally reviewed pertinent films in PACS  EKG, Pathology, and Other Studies: I have personally reviewed pertinent films in PACS  VTE Prophylaxis: Sequential compression device Niharika Schafer)     Code Status: No Order  Advance Directive and Living Will:      Power of :    POLST:      Counseling / Coordination of Care  N/A

## 2019-11-27 NOTE — ASSESSMENT & PLAN NOTE
Admitted for ischemic stroke after patient found on the ground with weakness   MCA and PCA territory ischemia on CT, rule out embolic stroke  Continue aspirin and statin, permissive hypertension and Keep blood pressure less than 220/120  DVT prophylaxis with 40 mg and Lovenox  Follow MRI, echocardiogram, telemetry  Puree Diet with honey thickened liquid, speech swallow study,  PTOT eval,  consult

## 2019-11-27 NOTE — ED NOTES
Pulse ox does not have good waveform, patient's hands have poor cap refill, which patient says is baseline for her       Starr Manley RN  11/27/19 3112

## 2019-11-27 NOTE — ASSESSMENT & PLAN NOTE
CHRISTELLE associated with rhabdomyolysis  Continue IV hydration  Monitor urine output, serum electrolytes, renal function daily  Nephrology consult

## 2019-11-28 ENCOUNTER — APPOINTMENT (INPATIENT)
Dept: CT IMAGING | Facility: HOSPITAL | Age: 64
DRG: 064 | End: 2019-11-28
Payer: MEDICARE

## 2019-11-28 PROBLEM — C79.9 MULTIPLE LESIONS OF METASTATIC MALIGNANCY (HCC): Status: ACTIVE | Noted: 2019-11-28

## 2019-11-28 PROBLEM — J69.0 ASPIRATION PNEUMONIA (HCC): Status: ACTIVE | Noted: 2019-11-28

## 2019-11-28 LAB
AMPHETAMINES SERPL QL SCN: NEGATIVE
ANION GAP SERPL CALCULATED.3IONS-SCNC: 12 MMOL/L (ref 4–13)
APTT PPP: 29 SECONDS (ref 23–37)
BACTERIA UR QL AUTO: ABNORMAL /HPF
BARBITURATES UR QL: NEGATIVE
BASOPHILS # BLD AUTO: 0.03 THOUSANDS/ΜL (ref 0–0.1)
BASOPHILS NFR BLD AUTO: 0 % (ref 0–1)
BENZODIAZ UR QL: NEGATIVE
BILIRUB UR QL STRIP: ABNORMAL
BUN SERPL-MCNC: 42 MG/DL (ref 5–25)
CALCIUM SERPL-MCNC: 7.8 MG/DL (ref 8.3–10.1)
CHLORIDE SERPL-SCNC: 107 MMOL/L (ref 100–108)
CHOLEST SERPL-MCNC: 135 MG/DL (ref 50–200)
CK MB SERPL-MCNC: 1.1 % (ref 0–2.5)
CK MB SERPL-MCNC: 42.5 NG/ML (ref 0–5)
CK SERPL-CCNC: 3727 U/L (ref 26–192)
CLARITY UR: CLEAR
CO2 SERPL-SCNC: 23 MMOL/L (ref 21–32)
COCAINE UR QL: NEGATIVE
COLOR UR: YELLOW
CREAT SERPL-MCNC: 0.8 MG/DL (ref 0.6–1.3)
EOSINOPHIL # BLD AUTO: 0.08 THOUSAND/ΜL (ref 0–0.61)
EOSINOPHIL NFR BLD AUTO: 1 % (ref 0–6)
ERYTHROCYTE [DISTWIDTH] IN BLOOD BY AUTOMATED COUNT: 14 % (ref 11.6–15.1)
EST. AVERAGE GLUCOSE BLD GHB EST-MCNC: 117 MG/DL
GFR SERPL CREATININE-BSD FRML MDRD: 77 ML/MIN/1.73SQ M
GLUCOSE SERPL-MCNC: 117 MG/DL (ref 65–140)
GLUCOSE SERPL-MCNC: 125 MG/DL (ref 65–140)
GLUCOSE SERPL-MCNC: 145 MG/DL (ref 65–140)
GLUCOSE SERPL-MCNC: 155 MG/DL (ref 65–140)
GLUCOSE UR STRIP-MCNC: NEGATIVE MG/DL
HBA1C MFR BLD: 5.7 % (ref 4.2–6.3)
HCT VFR BLD AUTO: 30 % (ref 34.8–46.1)
HDLC SERPL-MCNC: 12 MG/DL
HGB BLD-MCNC: 9.7 G/DL (ref 11.5–15.4)
HGB UR QL STRIP.AUTO: ABNORMAL
HYALINE CASTS #/AREA URNS LPF: ABNORMAL /LPF
IMM GRANULOCYTES # BLD AUTO: 0.47 THOUSAND/UL (ref 0–0.2)
IMM GRANULOCYTES NFR BLD AUTO: 3 % (ref 0–2)
INR PPP: 1.77 (ref 0.84–1.19)
KETONES UR STRIP-MCNC: NEGATIVE MG/DL
LDLC SERPL CALC-MCNC: 79 MG/DL (ref 0–100)
LEUKOCYTE ESTERASE UR QL STRIP: NEGATIVE
LYMPHOCYTES # BLD AUTO: 2.16 THOUSANDS/ΜL (ref 0.6–4.47)
LYMPHOCYTES NFR BLD AUTO: 14 % (ref 14–44)
MCH RBC QN AUTO: 30.7 PG (ref 26.8–34.3)
MCHC RBC AUTO-ENTMCNC: 32.3 G/DL (ref 31.4–37.4)
MCV RBC AUTO: 95 FL (ref 82–98)
METHADONE UR QL: NEGATIVE
MONOCYTES # BLD AUTO: 1.58 THOUSAND/ΜL (ref 0.17–1.22)
MONOCYTES NFR BLD AUTO: 10 % (ref 4–12)
NEUTROPHILS # BLD AUTO: 11.34 THOUSANDS/ΜL (ref 1.85–7.62)
NEUTS SEG NFR BLD AUTO: 72 % (ref 43–75)
NITRITE UR QL STRIP: NEGATIVE
NON-SQ EPI CELLS URNS QL MICRO: ABNORMAL /HPF
NRBC BLD AUTO-RTO: 1 /100 WBCS
OPIATES UR QL SCN: NEGATIVE
PCP UR QL: NEGATIVE
PH UR STRIP.AUTO: 5 [PH]
PLATELET # BLD AUTO: 89 THOUSANDS/UL (ref 149–390)
PMV BLD AUTO: 13.5 FL (ref 8.9–12.7)
POTASSIUM SERPL-SCNC: 4.3 MMOL/L (ref 3.5–5.3)
PROT UR STRIP-MCNC: ABNORMAL MG/DL
PROTHROMBIN TIME: 20.8 SECONDS (ref 11.6–14.5)
RBC # BLD AUTO: 3.16 MILLION/UL (ref 3.81–5.12)
RBC #/AREA URNS AUTO: ABNORMAL /HPF
SODIUM SERPL-SCNC: 142 MMOL/L (ref 136–145)
SP GR UR STRIP.AUTO: 1.02 (ref 1–1.03)
THC UR QL: NEGATIVE
TRIGL SERPL-MCNC: 218 MG/DL
URATE CRY URNS QL MICRO: ABNORMAL /HPF
UROBILINOGEN UR QL STRIP.AUTO: 0.2 E.U./DL
WBC # BLD AUTO: 15.66 THOUSAND/UL (ref 4.31–10.16)
WBC #/AREA URNS AUTO: ABNORMAL /HPF

## 2019-11-28 PROCEDURE — 99233 SBSQ HOSP IP/OBS HIGH 50: CPT | Performed by: INTERNAL MEDICINE

## 2019-11-28 PROCEDURE — 85610 PROTHROMBIN TIME: CPT | Performed by: INTERNAL MEDICINE

## 2019-11-28 PROCEDURE — 99223 1ST HOSP IP/OBS HIGH 75: CPT | Performed by: INTERNAL MEDICINE

## 2019-11-28 PROCEDURE — 82553 CREATINE MB FRACTION: CPT | Performed by: INTERNAL MEDICINE

## 2019-11-28 PROCEDURE — 80307 DRUG TEST PRSMV CHEM ANLYZR: CPT | Performed by: INTERNAL MEDICINE

## 2019-11-28 PROCEDURE — 74177 CT ABD & PELVIS W/CONTRAST: CPT

## 2019-11-28 PROCEDURE — 80061 LIPID PANEL: CPT | Performed by: INTERNAL MEDICINE

## 2019-11-28 PROCEDURE — 81001 URINALYSIS AUTO W/SCOPE: CPT | Performed by: INTERNAL MEDICINE

## 2019-11-28 PROCEDURE — 85730 THROMBOPLASTIN TIME PARTIAL: CPT | Performed by: INTERNAL MEDICINE

## 2019-11-28 PROCEDURE — 80048 BASIC METABOLIC PNL TOTAL CA: CPT | Performed by: INTERNAL MEDICINE

## 2019-11-28 PROCEDURE — 82948 REAGENT STRIP/BLOOD GLUCOSE: CPT

## 2019-11-28 PROCEDURE — 85025 COMPLETE CBC W/AUTO DIFF WBC: CPT | Performed by: INTERNAL MEDICINE

## 2019-11-28 PROCEDURE — 82550 ASSAY OF CK (CPK): CPT | Performed by: INTERNAL MEDICINE

## 2019-11-28 PROCEDURE — 83036 HEMOGLOBIN GLYCOSYLATED A1C: CPT | Performed by: INTERNAL MEDICINE

## 2019-11-28 RX ORDER — ASPIRIN 300 MG/1
300 SUPPOSITORY RECTAL DAILY
Status: DISCONTINUED | OUTPATIENT
Start: 2019-11-28 | End: 2019-11-29

## 2019-11-28 RX ADMIN — CEFTRIAXONE 1000 MG: 1 INJECTION, SOLUTION INTRAVENOUS at 15:13

## 2019-11-28 RX ADMIN — SODIUM BICARBONATE 50 ML/HR: 84 INJECTION, SOLUTION INTRAVENOUS at 12:47

## 2019-11-28 RX ADMIN — SODIUM CHLORIDE 125 ML/HR: 0.9 INJECTION, SOLUTION INTRAVENOUS at 02:12

## 2019-11-28 RX ADMIN — IOHEXOL 100 ML: 350 INJECTION, SOLUTION INTRAVENOUS at 11:16

## 2019-11-28 RX ADMIN — ASPIRIN 300 MG: 300 SUPPOSITORY RECTAL at 10:00

## 2019-11-28 RX ADMIN — INSULIN LISPRO 1 UNITS: 100 INJECTION, SOLUTION INTRAVENOUS; SUBCUTANEOUS at 23:48

## 2019-11-28 RX ADMIN — ENOXAPARIN SODIUM 40 MG: 40 INJECTION SUBCUTANEOUS at 10:00

## 2019-11-28 NOTE — ASSESSMENT & PLAN NOTE
Presented with hypoxia, leukocytosis, chest x-ray finding of upper lobe infiltrate  Broad-spectrum IV antibiotics

## 2019-11-28 NOTE — ASSESSMENT & PLAN NOTE
CHRISTELLE on admission, improved with hydration  Continue to monitor renal function, urine output, serum electrolyte

## 2019-11-28 NOTE — ASSESSMENT & PLAN NOTE
Elevated AST, ALT and bilirubin  Ultrasound of the abdomen done and showed:  1  2 4 x 1 4 cm pancreatic tail mass suspected  2 Extensive hepatic lesions suspicious for metastases  2 4 cm soft tissue nodule anterior to the right kidney, also possibly metastasis

## 2019-11-28 NOTE — SPEECH THERAPY NOTE
Speech-Language Pathology Bedside Swallow Evaluation      Patient Name: Olegario Roblero    VXGAX'T Date: 11/27/2019     Problem List  Principal Problem:    Ischemic stroke [facility])  Active Problems:    Rhabdomyolysis    Acute renal failure (ARF) (HCC)    Hypoxia    Elevated transaminase level      Past Medical History  Past Medical History:   Diagnosis Date    Acute renal failure (ARF) ([facility] [address] ) 11/27/2019    Arthritis        Past Surgical History  History reviewed  No pertinent surgical history  Summary   Pt presented with severe oral and suspected severe pharyngeal dysphagia characterized by reduced oral agility, delayed swallow initiation, desaturation of SPO2 >5% with po trials, and reduced airway protection  Recommend NPO until completion of VFSS  Risk/s for Aspiration: high risk, frequent completion of oral care     Recommended Diet: NPO   Recommended Form of Meds: non-oral if possible   Aspiration precautions and swallowing strategies: upright posture to aid in respiratory support      Current Medical Status  Pt is a 75 y o  female who presented to ER with left sided body weakness and facial droop  Family reported inability to contact for approximately 2 days until found on floor of home  Current Precautions:  Fall  Aspiration      Past medical history:Please see H&P for details    Special Studies:  MRI of brain: 11/27/2019 Numerous supra and infratentorial infarcts are identified likely representing embolic phenomenon  The largest infarcts in the right MCA is to be seen posteriorly involving the right temporal occipital lobes as well as the posterior insular cortex, and   right parietal cortex  Social/Education/Vocational Hx:  Pt lives alone  Retired  Independent all aspects  Cognitive/ Speech/language screen  Primary concern swallow function at this time  95% intelligibility @ conversation level  No instances of anomia  Press of speech but able to redirect as needed   Evaluation to follow  Swallow Information   Current Risks for Dysphagia & Aspiration: CVA  Current Symptoms/Concerns: change in respiratory status  Current Diet: NPO   Baseline Diet: regular diet and thin liquids      Baseline Assessment   Behavior/Cognition: alert  Speech/Language Status: able to participate in conversation with redirections  Patient Positioning: upright in bed  Required nsg assist due to severity of left neglect  Pain Status/Interventions/Response to Interventions: No report of pain  Swallow Mechanism Exam  Facial: left facial droop  Labial: decreased ROM left side, decreased strength and decreased coordination  Lingual: decreased strength left side  Velum: symmetrical  Mandible: decreased ROM left side  Dentition: adequate and natural  Vocal quality:clear/adequate   Volitional Cough: unable to initiate volitional cough   Respiratory Status: on RA   on L O2  on vapotherm on HFNC    L at   %FiO2  Tracheostomy: n/a    Tactile sensation on right adequate  Absent sensation on left to mid cheek, corner of labial, and chin  Reported to nsg who stated pt reporting feeling approximately 2 hours prior to evaluation  Consistencies Assessed and Performance   Consistencies Administered: honey thick and puree  Materials administered included spoon presentation only    Oral and Pharyngeal Stage: severe   Pt presents with severe oral and suspected severe pharyngeal dysphagia based upon tolerance of trials consumed  All trials fed by therapist in order to rate and bolus modify  Poor oral manipulation with prompts for opening  Consistent minimal requiring verbal for increase in order for insertion of utensil  Puree and HTL trials of small and medium size boluses yielded adequate oral containment via absent anterior loss, prolonged formulation and pooling of bolus to left buccal region, suspected delayed transfer, and mildly delayed swallow initiation   With 100% of trials SPO2 % desat by 5-10 suggesting possible silent aspiration as no overt response present  Verbal prompt for volitional swallow requiring 20-30s for completion and at times due to distractibility did not complete and pt initiated participation in conversation  1 instance of wet vocal quality out of 8 trials  Esophageal Concerns: none reported    Strategies and Efficacy: none at this time  Will await results from VFSS to determine effective strategies  Summary and Recommendations (see above)    Results Reviewed with: patient and RN     Pt/Family Education: initiated  Pt and caregivers would benefit from/require continued education  Treatment Recommended: dysphagia tx 1-3x week x 2 weeks    Patient Stated Goal:"to be able to eat again"    Dysphagia LTG per SLP  -Patient will demonstrate optimum safety and efficacy of oral intake and swallowing function without overt s/sx of penetration or aspiration for the highest appropriate diet level       Short Term Goals:  -Pt will tolerate Dysphagia 1/pureed diet and honey thick with no significant s/s oral or pharyngeal dysphagia across 1-3 diagnostic session/s  -Completion of VFSS to r/o aspiration and determine LRD       Speech Therapy Prognosis   Prognosis: fair    Prognosis Considerations: age and medical status

## 2019-11-28 NOTE — PROGRESS NOTES
Progress Note - Tamiko Fernando 1955, 75 y o  female MRN: [id number]    Unit/Bed#: -01 Encounter: [id number]    Primary Care Provider: No primary care provider on file  Date and time admitted to hospital: 11/27/2019  9:18 AM    CT abdomen Pelvis done and showed:   1  Questionable filling defect in a right lower lobe segmental pulmonary artery, suspicious for pulmonary embolism   Further evaluation with dedicated chest CTA can be performed, if this would change patient management  2   2 5 cm hypoattenuating pancreatic tail lesion, suspicious for malignancy (pancreatic adenocarcinoma)   There is a diminutive splenic vein, suspicious for tumor involvement/thrombosis   No tumor involvement of the celiac axis, SMA or portal vein  3   Numerous liver lesions as described, suspicious for metastasis    Spoke with the family and updated the information  Family confirmed patient is DNR/DNI as per her wish  Patient's sister Nisha Cloud, said patient used to say not willing for chemotherapy if she's diagnosed with cancer  Regarding PE seen on the CT scan I explained the management is anticoagulation which may put patient at increased risk of intracranial bleeding  Her brother and her sister who are the next of kin suggested for comfort care per patient's wish  * Ischemic stroke [facility])  Assessment & Plan  Admitted for ischemic stroke after patient found on the ground with weakness   MCA and PCA territory ischemia on CT, rule out embolic stroke  MRI also showed: Numerous supra and infratentorial infarcts are identified likely representing embolic phenomenon  The largest infarcts in the right MCA is to be seen posteriorly involving the right temporal occipital lobes as well as the posterior insular cortex, and   right parietal cortex  Continue aspirin and statin, permissive hypertension and Keep blood pressure less than 220/120    DVT prophylaxis with 40 mg and Lovenox  MRA carotid, echocardiogram, telemetry  Appreciate speech and swallow eval, suggested NPO, video fluoroscopy to follow  PTOT eval,  consult  Spoke again with Neurology today and suggested to continue aspirin as the risk of bleeding is high with added Plavix    Rhabdomyolysis  Assessment & Plan  Patient on the ground for unknown duration  CK levels showing improvement  Follow CK level, serum electrolytes, renal function  Continue normal saline at 125 ml/hr    Multiple lesions of metastatic malignancy [facility])  Assessment & Plan  Metastatic lesions of liver, kidney  Pancreatic tail mass seen on ultrasound  CT abdomen pelvis with contrast ordered today     Acute renal failure (ARF) (HCC)  Assessment & Plan  CHRISTELLE on admission, improved with hydration  Continue to monitor renal function, urine output, serum electrolyte  Elevated transaminase level  Assessment & Plan  Elevated AST, ALT and bilirubin  Ultrasound of the abdomen done and showed:  1  2 4 x 1 4 cm pancreatic tail mass suspected  2 Extensive hepatic lesions suspicious for metastases  2 4 cm soft tissue nodule anterior to the right kidney, also possibly metastasis  Aspiration pneumonia ([address] )  Assessment & Plan  Presented with hypoxia, leukocytosis, chest x-ray finding of upper lobe infiltrate  Broad-spectrum IV antibiotics    Hypoxia  Assessment & Plan  Hypoxia associated with bilateral hand and feet cyanosis  Likely peripheral vascular disease, follow-up duplex study  Vascular follow-up     VTE Pharmacologic Prophylaxis:   Pharmacologic: Enoxaparin (Lovenox)    Patient Centered Rounds: I have performed bedside rounds with nursing staff today    Discussions with Specialists or Other Care Team Provider:     Education and Discussions with Family / Patient:       Current Length of Stay: 1 day(s)    Current Patient Status: Inpatient   Certification Statement: The patient will continue to require additional inpatient hospital stay due to Stroke    Discharge Plan:   In 3-5 days    Code Status: Level 1 - Full Code      Subjective: The patient grimaces and not complaining anything  Objective:     Vitals:   Temp (24hrs), Av 4 °F (36 9 °C), Min:97 6 °F (36 4 °C), Max:99 7 °F (37 6 °C)    Temp:  [97 6 °F (36 4 °C)-99 7 °F (37 6 °C)] 99 7 °F (37 6 °C)  HR:  [100-122] 100  Resp:  [22-35] 26  BP: (114-156)/(65-87) 139/78  SpO2:  [92 %-100 %] 97 %  Body mass index is 20 79 kg/m²  Input and Output Summary (last 24 hours): Intake/Output Summary (Last 24 hours) at 2019 1209  Last data filed at 2019 0645  Gross per 24 hour   Intake 2564 58 ml   Output 350 ml   Net 2214 58 ml       Physical Exam:     General appearance: alert  Head: Normocephalic, without obvious abnormality, atraumatic  Lungs: clear to auscultation bilaterally  Heart: regular rate and rhythm  Abdomen: soft, non-tender, positive bowel sounds   Back: negative  Extremities: Bilateral toe and finger tip cyanosis, radial artery possible  Neurologic: Mental status: alertness: alert    Additional Data:     Labs:    Results from last 7 days   Lab Units 19  0938   WBC Thousand/uL 15 66*  --  14 01*   HEMOGLOBIN g/dL 9 7*  --  11 0*   HEMATOCRIT % 30 0*  --  35 3   PLATELETS Thousands/uL 89*   < > 49*   BANDS PCT %  --   --  6   NEUTROS PCT % 72  --   --    LYMPHS PCT % 14  --   --    LYMPHO PCT %  --   --  9*   MONOS PCT % 10  --   --    MONO PCT %  --   --  3*   EOS PCT % 1  --  0    < > = values in this interval not displayed       Results from last 7 days   Lab Units 196 19  0938   SODIUM mmol/L 142 137   POTASSIUM mmol/L 4 3 5 1   CHLORIDE mmol/L 107 102   CO2 mmol/L 23 22   BUN mg/dL 42* 73*   CREATININE mg/dL 0 80 1 69*   ANION GAP mmol/L 12 13   CALCIUM mg/dL 7 8* 7 9*   ALBUMIN g/dL  --  3 5   TOTAL BILIRUBIN mg/dL  --  2 17*   ALK PHOS U/L  --  184*   ALT U/L  --  2,733*   AST U/L  --  7,506*   GLUCOSE RANDOM mg/dL 117 352*     Results from last 7 days   Lab Units 11/28/19  0446   INR  1 77*     Results from last 7 days   Lab Units 11/28/19  1135 11/27/19  2221 11/27/19  1719 11/27/19  1707 11/27/19  1703 11/27/19  0934 11/27/19  0921   POC GLUCOSE mg/dl 125 123 228* >500* <20* 315* >500*     Results from last 7 days   Lab Units 11/28/19  0446   HEMOGLOBIN A1C % 5 7     Results from last 7 days   Lab Units 11/27/19  1727 11/27/19  0938   LACTIC ACID mmol/L 2 0 3 5*           * I Have Reviewed All Lab Data Listed Above  * Additional Pertinent Lab Tests Reviewed: [address] Admission Reviewed    Imaging:    Imaging Reports Reviewed Today Include: images reviewed    Recent Cultures (last 7 days):           Last 24 Hours Medication List:     Current Facility-Administered Medications:  aspirin 300 mg Rectal Daily Too Baig MD    atorvastatin 40 mg Oral QPM Too Baig MD    cefTRIAXone 1,000 mg Intravenous Q24H Too Baig MD Last Rate: 1,000 mg (11/27/19 1810)   enoxaparin 40 mg Subcutaneous Daily Too Baig MD    influenza vaccine 0 5 mL Intramuscular Prior to discharge Too Baig MD    insulin lispro 1-5 Units Subcutaneous TID AC SANFORD Lewis    insulin lispro 1-5 Units Subcutaneous HS SANFORD Lewis    nicotine 1 patch Transdermal Daily Too Baig MD    pneumococcal 13-valent conjugate vaccine 0 5 mL Intramuscular Prior to discharge Too Baig MD    sodium bicarbonate infusion 50 mL/hr Intravenous Continuous Tamsen Saint, DO         Today, Patient Was Seen By: Too Baig MD    ** Please Note: Dictation voice to text software may have been used in the creation of this document   **

## 2019-11-28 NOTE — ASSESSMENT & PLAN NOTE
Hypoxia associated with bilateral hand and feet cyanosis  Likely peripheral vascular disease, follow-up duplex study  Vascular follow-up

## 2019-11-28 NOTE — ASSESSMENT & PLAN NOTE
Metastatic lesions of liver, kidney    Pancreatic tail mass seen on ultrasound  CT abdomen pelvis with contrast ordered today

## 2019-11-28 NOTE — ASSESSMENT & PLAN NOTE
Admitted for ischemic stroke after patient found on the ground with weakness   MCA and PCA territory ischemia on CT, rule out embolic stroke  MRI also showed: Numerous supra and infratentorial infarcts are identified likely representing embolic phenomenon  The largest infarcts in the right MCA is to be seen posteriorly involving the right temporal occipital lobes as well as the posterior insular cortex, and   right parietal cortex  Continue aspirin and statin, permissive hypertension and Keep blood pressure less than 220/120  DVT prophylaxis with 40 mg and Lovenox  MRA carotid, echocardiogram, telemetry  Appreciate speech and swallow eval, suggested NPO, video fluoroscopy to follow    PTOT eval,  consult  Spoke again with Neurology today and suggested to continue aspirin as the risk of bleeding is high with added Plavix

## 2019-11-28 NOTE — CONSULTS
Consultation - Nephrology   Nicolas Duron 75 y o  female MRN: [id number]  Unit/Bed#: -01 Encounter: [id number]      A/P:  1  Acute renal failure   Significantly improved status post volume expansion  Would continue with IV fluids at this time, will transition to different variety please refer below  Will defer further evaluation with respect to urine studies given significant improvement  2  Volume depletion   Most likely due to lack of fluid intake after the patient was found on the ground for potentially up to 2 days  Volume status at this time is approaching euvolemia  3  Rhabdomyolysis   Improving, will optimize excretion with alkalization of the urine, will start half-normal saline with 75 mEq per L of sodium bicarbonate at 50 mL/hour for now  Continue closely monitor the patient's volume status  Continue to monitor CPKs at least daily  4  Ischemic stroke  5  Possible pulmonary embolus  6  Possible pancreatic malignancy with involvement of the splenic vein and liver          Thank you for allowing us to participate in the care of your patient  Please feel free to contact us regarding the care of this patient, or any other questions/concerns that may be applicable  Patient Active Problem List   Diagnosis    Ischemic stroke ([address] )    Rhabdomyolysis    Acute renal failure (ARF) (HCC)    Hypoxia    Elevated transaminase level    Multiple lesions of metastatic malignancy ([address] )    Aspiration pneumonia ([facility] )       History of Present Illness   Physician Requesting Consult: Irasema Rosado MD  Reason for Consult / Principal Problem:  Acute renal failure/rhabdomyolysis  Hx and PE limited by:   HPI: Olegario Roblero is a 75y o  year old female who presented to the emergency department the yesterday November 27th after being found home on the floor    It is not clear exactly how long she is already on, the patient was contacted on daily basis by her sister has not been able to reach her for 2 days prior to being found on the ground  At presentation, patient was noted have left hemiparesis as well as patient  Prior to this hospitalization, no significant medical issues are known for this patient  From the renal standpoint, patient present creatinine of 1 69 mg/dL with no significant electrolyte abnormalities however her urea nitrogen was 63 mg/dL and total CK was around 5,000  Patient was given volume expansion at presentation and this morning, patient's creatinine has improved significantly down to 0 8 mg/dL in CK is also decreased to about 3700  There is urinalysis available from this morning which noted a urine pH of 5 0  Of note all past medical history history of present illness were obtained from review electronic medical records due to the patient's current mental status which is poor  Patient had a CT scan with contrast today patient id potential pulmonary embolus, condition appears to be a pancreatic lesion consistent with adenocarcinoma with possible involvement of this morning main as well as numerous liver lesions  History obtained from chart review and unobtainable from patient due to mental status    Review of Systems - unobtainable    Historical Information   Past Medical History:   Diagnosis Date    Acute renal failure (ARF) ([facility] ) 11/27/2019    Arthritis      History reviewed  No pertinent surgical history    Social History   Social History     Substance and Sexual Activity   Alcohol Use Yes    Alcohol/week: 1 0 standard drinks    Types: 1 Shots of liquor per week    Frequency: 2-3 times a week    Drinks per session: 1 or 2    Binge frequency: Never    Comment: ocassional     Social History     Substance and Sexual Activity   Drug Use No     Social History     Tobacco Use   Smoking Status Current Every Day Smoker    Packs/day: 0 50    Years: 40 00    Pack years: 20 00    Types: Cigarettes   Smokeless Tobacco Never Used     Family History   Problem Relation Age of Onset    Diabetes Maternal Grandmother     Diabetes Maternal Grandfather     Diabetes Paternal Grandmother     Diabetes Paternal Grandfather        Meds/Allergies   all current active meds have been reviewed, current meds:   Current Facility-Administered Medications   Medication Dose Route Frequency    aspirin rectal suppository 300 mg  300 mg Rectal Daily    atorvastatin (LIPITOR) tablet 40 mg  40 mg Oral QPM    cefTRIAXone (ROCEPHIN) IVPB (premix) 1,000 mg  1,000 mg Intravenous Q24H    enoxaparin (LOVENOX) subcutaneous injection 40 mg  40 mg Subcutaneous Daily    influenza vaccine, high-dose (FLUZONE HIGH-DOSE) IM injection HATTIE 0 5 mL  0 5 mL Intramuscular Prior to discharge    insulin lispro (HumaLOG) 100 units/mL subcutaneous injection 1-5 Units  1-5 Units Subcutaneous TID AC    insulin lispro (HumaLOG) 100 units/mL subcutaneous injection 1-5 Units  1-5 Units Subcutaneous HS    nicotine (NICODERM CQ) 14 mg/24hr TD 24 hr patch 1 patch  1 patch Transdermal Daily    pneumococcal 13-valent conjugate vaccine (PREVNAR-13) IM injection 0 5 mL  0 5 mL Intramuscular Prior to discharge    sodium bicarbonate 75 mEq in dextrose 5 % 1,000 mL infusion  50 mL/hr Intravenous Continuous    and PTA meds:    Medications Prior to Admission   Medication    aspirin (ECOTRIN) 325 mg EC tablet    Multiple Vitamin (MULTIVITAMIN) tablet         No Known Allergies    Objective     Intake/Output Summary (Last 24 hours) at 11/28/2019 1320  Last data filed at 11/28/2019 1232  Gross per 24 hour   Intake 2564 58 ml   Output 950 ml   Net 1614 58 ml       Invasive Devices:        Physical Exam      I/O last 3 completed shifts:   In: 3564 6 [I V :1564 6; IV Piggyback:2000]  Out: 350 [Urine:350]    Vitals:    11/28/19 0846   BP: 139/78   Pulse: 100   Resp: (!) 26   Temp: 99 7 °F (37 6 °C)   SpO2: 97%       Gen: in NAD, opens eyes occasionally  HEENT: no sclerous icterus, MMM, neck supple  CV: +S1/S2, RRR  Lungs: CTA bilaterally  Abd: +BS, soft NT/ND  Ext: all four extremities are warm  Skin: no rashes noted, bilateral upper and lower extremities with ecchymotic areas along with folic edema  Neuro:  Decreased mentation, opens eyes potentially on command although inconsistent    Current Weight: Weight - Scale: 49 9 kg (110 lb 0 2 oz)  First Weight: Weight - Scale: 50 4 kg (111 lb 1 8 oz)    Lab Results:  I have personally reviewed pertinent labs      CBC:   Lab Results   Component Value Date    WBC 15 66 (H) 11/28/2019    HGB 9 7 (L) 11/28/2019    HCT 30 0 (L) 11/28/2019    MCV 95 11/28/2019    PLT 89 (L) 11/28/2019    MCH 30 7 11/28/2019    MCHC 32 3 11/28/2019    RDW 14 0 11/28/2019    MPV 13 5 (H) 11/28/2019    NRBC 1 11/28/2019     CMP:   Lab Results   Component Value Date    K 4 3 11/28/2019     11/28/2019    CO2 23 11/28/2019    BUN 42 (H) 11/28/2019    CREATININE 0 80 11/28/2019    CALCIUM 7 8 (L) 11/28/2019    EGFR 77 11/28/2019     Phosphorus:   Lab Results   Component Value Date    PHOS 3 3 11/27/2019     Magnesium: No results found for: MG  Urinalysis:   Lab Results   Component Value Date    COLORU Yellow 11/28/2019    CLARITYU Clear 11/28/2019    SPECGRAV 1 025 11/28/2019    PHUR 5 0 11/28/2019    LEUKOCYTESUR Negative 11/28/2019    NITRITE Negative 11/28/2019    GLUCOSEU Negative 11/28/2019    KETONESU Negative 11/28/2019    BILIRUBINUR Small (A) 11/28/2019    BLOODU Moderate (A) 11/28/2019     Ionized Calcium: No results found for: CAION  Coagulation:   Lab Results   Component Value Date    INR 1 77 (H) 11/28/2019     Troponin: No results found for: TROPONINI  ABG: No results found for: PHART, THN4GST, PO2ART, EPS1RYB, T0IQAWNX, BEART, SOURCE    Results from last 7 days   Lab Units 11/28/19  0446 11/27/19  0938   POTASSIUM mmol/L 4 3 5 1   CHLORIDE mmol/L 107 102   CO2 mmol/L 23 22   BUN mg/dL 42* 73*   CREATININE mg/dL 0 80 1 69*   CALCIUM mg/dL 7 8* 7 9*   ALK PHOS U/L  --  184*   ALT U/L  --  2,733*   AST U/L  --  7,506*       Radiology review:  Procedure: Xr Chest 1 View Portable    Result Date: 11/27/2019  Narrative: CHEST INDICATION:   ams  COMPARISON:  None EXAM PERFORMED/VIEWS:  XR CHEST PORTABLE FINDINGS: The cardiac silhouette is mildly enlarged  The lungs are clear  No pneumothorax or pleural effusion  Osseous structures appear within normal limits for patient age  Impression: No acute cardiopulmonary disease  Workstation performed: [id number]     Procedure: Ct Head Without Contrast    Result Date: 11/27/2019  Narrative: CT BRAIN - WITHOUT CONTRAST INDICATION:   Altered mental status  COMPARISON:  None  TECHNIQUE:  CT examination of the brain was performed  In addition to axial images, coronal 2D reformatted images were created and submitted for interpretation  Radiation dose length product (DLP) for this visit:  827 mGy-cm   This examination, like all CT scans performed in the [facility], was performed utilizing techniques to minimize radiation dose exposure, including the use of iterative reconstruction and automated exposure control  IMAGE QUALITY:  Diagnostic  FINDINGS: PARENCHYMA:  Cytotoxic edema is identified in the right MCA and PCA territories consistent with evolving infarcts  Mild global atrophy is identified  Chronic microvascular ischemic change is present  No hemorrhage is identified  VENTRICLES AND EXTRA-AXIAL SPACES:  Normal for the patient's age  VISUALIZED ORBITS AND PARANASAL SINUSES:  Unremarkable  CALVARIUM AND EXTRACRANIAL SOFT TISSUES:  Normal      Impression: Evolving right MCA and PCA territory infarcts  I personally discussed this study with Matilda Rubalcava on 11/27/2019 at 10:03 AM  Workstation performed: [id number]     Procedure: Ct Chest Wo Contrast    Result Date: 11/28/2019  Narrative: CT CHEST WITHOUT IV CONTRAST INDICATION: 67-year-old female with left-sided body weakness and fascial droop    Multiple acute infarcts seen on prior brain MRI, likely related to embolic phenomenon  Prior ultrasound revealed possible pancreatic lesion with liver metastasis  Evaluate  for metastatic disease  COMPARISON:  X-ray chest dated November 27, 2019, MRI brain and right upper quadrant abdominal ultrasound dated November 27, 2019  TECHNIQUE: CT examination of the chest was performed without intravenous contrast   Axial, sagittal, and coronal 2D reformatted images were created from the source data and submitted for interpretation  Radiation dose length product (DLP) for this visit:  226 mGy-cm   This examination, like all CT scans performed in the [facility], was performed utilizing techniques to minimize radiation dose exposure, including the use of iterative reconstruction and automated exposure control  FINDINGS: LUNGS:  The central airways are patent  There is patchy right lower lobe opacity, likely reflecting atelectasis, pneumonia or sequela of aspiration (series 3 image 96)  There is an ill-defined opacity in the right upper lobe measuring 1 3 x 0 6 cm (series 601 image 63, series 6 image 62), which may be related to infectious/inflammatory process  However, follow-up to resolution is recommended to exclude underlying lesion  There is 6 mm nodule in the left costophrenic sulcus (series 3 image 102)  PLEURA:  No pleural effusion or pneumothorax  HEART/GREAT VESSELS:  Mild cardiomegaly  No pericardial effusion  Coronary artery calcifications are present  Normal caliber and course of the great vessels  MEDIASTINUM AND CEDRICK:  Unremarkable  CHEST WALL AND LOWER NECK:   Unremarkable  VISUALIZED STRUCTURES IN THE UPPER ABDOMEN:  Numerous ill-defined hypoattenuating liver lesions and the right and left hepatic lobes  A representative lesion in segment 7 measures 4 3 x 3 7 cm (series 2 image 57)  The pancreas is not adequately included on the field-of-view  OSSEOUS STRUCTURES:  No acute fracture or destructive osseous lesion  Impression: 1  Patchy right lower lobe opacity, likely reflecting atelectasis, pneumonia or sequela of aspiration  2   1 3 x 0 6 cm ill-defined right upper lobe opacity, which may be related to infectious/inflammatory process  However, follow-up chest CT in 3 months is recommended to evaluate for resolution/interval change  3   6 mm nodule at the left costophrenic sulcus  This can also be reassessed on follow-up imaging  4   Numerous ill-defined hypoattenuating liver lesions, similar to the prior ultrasound  Further characterization with dedicated contrast-enhanced liver protocol MRI is recommended  The study was marked in EPIC for significant notification  Workstation performed: [id number]     Procedure: Mra Head Wo Contrast    Result Date: 11/27/2019  Narrative: MRA BRAIN WITHOUT CONTRAST INDICATION:  stroke COMPARISON:  None  TECHNIQUE:  Axial 3-D time-of-flight imaging with 3-D reconstructions performed without contrast    IV Contrast:  Not administered  FINDINGS: IMAGE QUALITY:  Diagnostic  ANATOMY INTERNAL CAROTID ARTERIES:  Normal flow related enhancement of the distal cervical, petrous and cavernous segments of the internal carotid arteries  Normal ICA terminus  ANTERIOR CIRCULATION:  Normal A1 segments  Normal anterior communicating artery  Normal flow-related enhancement of the anterior cerebral arteries  MIDDLE CEREBRAL ARTERY CIRCULATION:  The M1 segment and middle cerebral artery branches demonstrate normal flow-related enhancement  There is cut off of an M2 branch on the right identified for the large MCA infarct  DISTAL VERTEBRAL ARTERIES:  Distal vertebral arteries are patient with a normal vertebrobasilar junction  The posterior inferior cerebellar artery origins are normal  BASILAR ARTERY:  Normal  POSTERIOR CEREBRAL ARTERIES:  Both posterior cerebral arteries arises from the basilar tip  Both arteries demonstrate normal flow-related enhancement       Impression: Right M2 branch cut off accounts for the large MCA infarct  Otherwise unremarkable MRA  Workstation performed: [id number]     Procedure: Mri Brain Wo Contrast    Result Date: 11/27/2019  Narrative: MRI BRAIN WITHOUT CONTRAST INDICATION: stroke  COMPARISON:   CT brain earlier the same day TECHNIQUE:  Sagittal T1, axial T2, axial FLAIR, axial T1, axial [place] and axial diffusion imaging  IMAGE QUALITY:  Diagnostic  FINDINGS: BRAIN PARENCHYMA:  Bilateral supra and infratentorial infarcts are identified the largest involves the right middle cerebral artery with involvement of the medial temporal lobe, right occipital lobe, posterior insular cortex, and right parietal lobe  There are innumerable smaller infarcts identified in the right frontal lobe, left hemisphere, and bilateral cerebellar hemispheres  Embolic phenomenon should be considered  Some areas of gyriform T1 hyperintensity in the right occipital lobe compatible  with nonhemorrhagic laminar necrosis  Small scattered hyperintensities on T2/FLAIR imaging are noted in the periventricular and subcortical white matter demonstrating an appearance that is statistically most likely to represent mild microangiopathic change  VENTRICLES:  Normal for the patient's age  SELLA AND PITUITARY GLAND:  Normal  ORBITS:  Normal  PARANASAL SINUSES:  Normal  VASCULATURE:  Evaluation of the major intracranial vasculature demonstrates appropriate flow voids  CALVARIUM AND SKULL BASE:  Normal  EXTRACRANIAL SOFT TISSUES:  Normal      Impression: Numerous supra and infratentorial infarcts are identified likely representing embolic phenomenon  The largest infarcts in the right MCA is to be seen posteriorly involving the right temporal occipital lobes as well as the posterior insular cortex, and right parietal cortex  Workstation performed: [id number]     Procedure: Us Right Upper Quadrant    Result Date: 11/27/2019  Narrative: RIGHT UPPER QUADRANT ULTRASOUND INDICATION:   Elevated AST ALT bilirubin   COMPARISON:  None TECHNIQUE:   Real-time ultrasound of the right upper quadrant was performed with a curvilinear transducer with both volumetric sweeps and still imaging techniques  FINDINGS: PANCREAS:  2 4 x 1 4 cm pancreatic tail mass suspected  AORTA AND IVC:  Visualized portions are normal for patient age  LIVER: Size:  Within normal range  The liver measures 16 2 cm in the midclavicular line  Contour:  Surface contour is smooth  Parenchyma:  Echogenicity and echotexture are within normal limits  Extensive hepatic lesions are identified  Although nonspecific, findings are concerning for possible metastases  The largest lesion in the right hepatic lobe measures 6 1 x 5 6 x 4 9 cm  Limited imaging of the main portal vein shows it to be patent and hepatopetal   BILIARY: The gallbladder is normal in caliber  No wall thickening or pericholecystic fluid  Sludge is identified  No sonographic Dutton's sign  No intrahepatic biliary dilatation  CBD measures 2 mm  No choledocholithiasis  KIDNEY: Right kidney measures 11 3 x 5 1 cm  Within normal limits  2 2 x 1 4 x 2 4 cm soft tissue nodule anterior to the right kidney  ASCITES:   Trace perihepatic free fluid  Impression: 1  2 4 x 1 4 cm pancreatic tail mass suspected  2  Extensive hepatic lesions suspicious for metastases  3  2 4 cm soft tissue nodule anterior to the right kidney, also possibly metastasis  4  Gallbladder sludge without wall thickening, shadowing gallstones or ultrasonographic Dutton's sign  5  Trace perihepatic free fluid  Given patient's elevated BUN/creatinine, noncontrast MRI abdomen would be recommended for further assessment and provide additional information for potential biopsy  Depending on patient's clinical (neurologic) status, noncontrast CT imaging may be a less helpful alternative    I personally discussed this study with Yanely Gilliland on 11/27/2019 at 5:19 PM   Workstation performed: [id number]     Procedure: Ct Abdomen Pelvis W Contrast    Result Date: 11/28/2019  Narrative: CT ABDOMEN AND PELVIS WITH IV CONTRAST INDICATION: 33-year-old female with recent stroke, and questionable pancreatic tail lesion as well as several liver lesions seen on a prior ultrasound  COMPARISON:  Right upper quadrant abdominal ultrasound dated November 27, 2019  TECHNIQUE:  CT examination of the abdomen and pelvis was performed  In addition to portal venous phase postcontrast scanning through the abdomen and pelvis, delayed phase postcontrast scanning was performed through the upper abdominal viscera  Axial, sagittal, and coronal 2D reformatted images were created from the source data and submitted for interpretation  Radiation dose length product (DLP) for this visit:  966 mGy-cm   This examination, like all CT scans performed in the [facility], was performed utilizing techniques to minimize radiation dose exposure, including the use of iterative reconstruction and automated exposure control  IV Contrast:  100 mL of iohexol (OMNIPAQUE) Enteric Contrast:  Enteric contrast was not administered  FINDINGS: ABDOMEN LOWER CHEST:  There is a questionable filling defect in a right lower lobe segmental pulmonary artery (series 3 image 1, series 603 image 78), suspicious for a pulmonary embolus  LIVER/BILIARY TREE:  There are numerous (approximately 30-40) hypoattenuating liver lesions in the right and left hepatic lobes, suspicious for metastasis  Representative examples include: - Segment 7 lesion measuring 4 4 x 3 9 cm (series 3 image 14)  -Segment 5/6 lesion measuring 6 0 x 4 9 cm (series 3 image 20)  No biliary ductal dilation  GALLBLADDER:  No calcified gallstones  No pericholecystic inflammatory change  SPLEEN: Moderate sized Wedge-shaped area of hypoattenuation in the spleen (series 3 image 16, series 603 image 90), in keeping with a a splenic infarct   PANCREAS:  There is a hypoattenuating lesion in the pancreatic tail measuring 2 5 x 1 1 cm (series 3 image 31), suspicious for malignancy (pancreatic adenocarcinoma)  No main pancreatic ductal dilation  There is diminutive the splenic vein, suspicious for tumor involvement/thrombosis  ADRENAL GLANDS:  Mild nodular thickening of the left adrenal gland  The right adrenal gland is unremarkable  KIDNEYS/URETERS:  No hydroureteronephrosis  Scattered subcentimeter hypoattenuating lesions bilaterally that are too small to characterize, statistically most likely benign  STOMACH AND BOWEL:  Extensive colonic diverticulosis without findings of acute diverticulitis  No bowel obstruction  APPENDIX:  No findings to suggest appendicitis  ABDOMINOPELVIC CAVITY:  Trace perihepatic fluid  No free intraperitoneal air  No lymphadenopathy  VESSELS:  Atherosclerotic changes are present  No evidence of aneurysm  There is a diminutive splenic vein, suspicious for tumor involvement/thrombosis  Incidental note is made of a circumaortic left renal vein, normal variant  The SMA and celiac axis are patent  Apparent filling defects in the common femoral veins bilaterally, which may representative venous thrombosis or flow artifacts (series 3 image 82-83)  Correlation can be performed with lower extremity venous Doppler  PELVIS REPRODUCTIVE ORGANS:  Mild thickening of the endometrial complex measuring 8mm  No suspicious adnexal mass  URINARY BLADDER:  Moderately distended  Small focus of nondependent intravesical air (series 3 image 59), likely from recent catheterization/instrumentation  No focal mass  ABDOMINAL WALL/INGUINAL REGIONS:  Foci of subcutaneous air in the ventral abdominal wall, likely related to medication injection  OSSEOUS STRUCTURES:  No acute fracture or destructive osseous lesion  Degenerative changes of the thoracolumbar spine with mild dextroconvex scoliosis  Bilateral hip osteoarthritis  Impression: 1    Questionable filling defect in a right lower lobe segmental pulmonary artery, suspicious for pulmonary embolism  Further evaluation with dedicated chest CTA can be performed, if this would change patient management  2   2 5 cm hypoattenuating pancreatic tail lesion, suspicious for malignancy (pancreatic adenocarcinoma)  There is a diminutive splenic vein, suspicious for tumor involvement/thrombosis  No tumor involvement of the celiac axis, SMA or portal vein  3   Numerous liver lesions as described, suspicious for metastasis  4   Moderate sized splenic infarct  5   Mildly thickened endometrial complex measuring 8 mm  6  Apparent filling defects in the common femoral veins bilaterally, which may representative venous thrombosis or flow artifacts  Correlation with lower extremity venous Doppler can be performed  I personally discussed this study with Dr Abhishek Ivan on 11/28/2019 at 12:07 PM  Workstation performed: [id number]       Counseling / Coordination of Care  Total ADDITIONAL floor / unit time spent today 10 minutes  Greater than 50% of total time was spent with the patient and / or family counseling and / or coordination of care  A description of the counseling / coordination of care:  Current issues on renal standpoint, plan of care  Zina Adams,       This consultation note was produced in part using a dictation device which may document imprecise wording from author's original intent

## 2019-11-28 NOTE — PLAN OF CARE
Problem: Potential for Falls  Goal: Patient will remain free of falls  Description  INTERVENTIONS:  - Assess patient frequently for physical needs  -  Identify cognitive and physical deficits and behaviors that affect risk of falls  -  [place] fall precautions as indicated by assessment   - Educate patient/family on patient safety including physical limitations  - Instruct patient to call for assistance with activity based on assessment  - Modify environment to reduce risk of injury  - Consider OT/PT consult to assist with strengthening/mobility  Outcome: Progressing     Problem: Nutrition/Hydration-ADULT  Goal: Nutrient/Hydration intake appropriate for improving, restoring or maintaining nutritional needs  Description  Monitor and assess patient's nutrition/hydration status for malnutrition  Collaborate with interdisciplinary team and initiate plan and interventions as ordered  Monitor patient's weight and dietary intake as ordered or per policy  Utilize nutrition screening tool and intervene as necessary  Determine patient's food preferences and provide high-protein, high-caloric foods as appropriate       INTERVENTIONS:  - Monitor oral intake, urinary output, labs, and treatment plans  - Assess nutrition and hydration status and recommend course of action  - Evaluate amount of meals eaten  - Assist patient with eating if necessary   - Allow adequate time for meals  - Recommend/ encourage appropriate diets, oral nutritional supplements, and vitamin/mineral supplements  - Order, calculate, and assess calorie counts as needed  - Recommend, monitor, and adjust tube feedings and TPN/PPN based on assessed needs  - Assess need for intravenous fluids  - Provide specific nutrition/hydration education as appropriate  - Include patient/family/caregiver in decisions related to nutrition  Outcome: Progressing     Problem: Neurological Deficit  Goal: Neurological status is stable or improving  Description  Interventions:  - Monitor and assess patient's level of consciousness, motor function, sensory function, and level of assistance needed for ADLs  - Monitor and report changes from baseline  Collaborate with interdisciplinary team to initiate plan and implement interventions as ordered  - Provide and maintain a safe environment  - Consider seizure precautions  - Consider fall precautions  - Consider aspiration precautions  - Consider bleeding precautions  Outcome: Progressing     Problem: Activity Intolerance/Impaired Mobility  Goal: Mobility/activity is maintained at optimum level for patient  Description  Interventions:  - Assess and monitor patient  barriers to mobility and need for assistive/adaptive devices  - Assess patient's emotional response to limitations  - Collaborate with interdisciplinary team and initiate plans and interventions as ordered  - Encourage independent activity per ability   - Maintain proper body alignment  - Perform active/passive rom as tolerated/ordered  - Plan activities to conserve energy   - Turn patient as appropriate  Outcome: Progressing     Problem: Communication Impairment  Goal: Ability to express needs and understand communication  Description  Assess patient's communication skills and ability to understand information  Patient will demonstrate use of effective communication techniques, alternative methods of communication and understanding even if not able to speak  - Encourage communication and provide alternate methods of communication as needed  - Collaborate with case management/ for discharge needs  - Include patient/family/caregiver in decisions related to communication  Outcome: Progressing     Problem: Potential for Aspiration  Goal: Non-ventilated patient's risk of aspiration is minimized  Description  Assess and monitor vital signs, respiratory status, and labs (WBC)    Monitor for signs of aspiration (tachypnea, cough, rales, wheezing, cyanosis, fever)  - Assess and monitor patient's ability to swallow  - Place patient up in chair to eat if possible  - HOB up at 90 degrees to eat if unable to get patient up into chair   - Supervise patient during oral intake  - Instruct patient/ family to take small bites  - Instruct patient/ family to take small single sips when taking liquids  - Follow patient-specific strategies generated by speech pathologist   Outcome: Progressing  Goal: Ventilated patient's risk of aspiration is minimized  Description  Assess and monitor vital signs, respiratory status, airway cuff pressure, and labs (WBC)  Monitor for signs of aspiration (tachypnea, cough, rales, wheezing, cyanosis, fever)  - Elevate head of bed 30 degrees if patient has tube feeding   - Monitor tube feeding  Outcome: Progressing     Problem: Nutrition  Goal: Nutrition/Hydration status is improving  Description  Monitor and assess patient's nutrition/hydration status for malnutrition (ex- brittle hair, bruises, dry skin, pale skin and conjunctiva, muscle wasting, smooth red tongue, and disorientation)  Collaborate with interdisciplinary team and initiate plan and interventions as ordered  Monitor patient's weight and dietary intake as ordered or per policy  Utilize nutrition screening tool and intervene per policy  Determine patient's food preferences and provide high-protein, high-caloric foods as appropriate  - Assist patient with eating   - Allow adequate time for meals   - Encourage patient to take dietary supplement as ordered  - Collaborate with clinical nutritionist   - Include patient/family/caregiver in decisions related to nutrition    Outcome: Progressing     Problem: NEUROSENSORY - ADULT  Goal: Achieves stable or improved neurological status  Description  INTERVENTIONS  - Monitor and report changes in neurological status  - Monitor vital signs such as temperature, blood pressure, glucose, and any other labs ordered   - Initiate measures to prevent increased intracranial pressure  - Monitor for seizure activity and implement precautions if appropriate      Outcome: Progressing  Goal: Remains free of injury related to seizures activity  Description  INTERVENTIONS  - Maintain airway, patient safety  and administer oxygen as ordered  - Monitor patient for seizure activity, document and report duration and description of seizure to physician/advanced practitioner  - If seizure occurs,  ensure patient safety during seizure  - Reorient patient post seizure  - Seizure pads on all 4 side rails  - Instruct patient/family to notify RN of any seizure activity including if an aura is experienced  - Instruct patient/family to call for assistance with activity based on nursing assessment  - Administer anti-seizure medications if ordered    Outcome: Progressing  Goal: Achieves maximal functionality and self care  Description  INTERVENTIONS  - Monitor swallowing and airway patency with patient fatigue and changes in neurological status  - Encourage and assist patient to increase activity and self care     - Encourage visually impaired, hearing impaired and aphasic patients to use assistive/communication devices  Outcome: Progressing     Problem: SKIN/TISSUE INTEGRITY - ADULT  Goal: Skin integrity remains intact  Description  INTERVENTIONS  - Identify patients at risk for skin breakdown  - Assess and monitor skin integrity  - Assess and monitor nutrition and hydration status  - Monitor labs (i e  albumin)  - Assess for incontinence   - Turn and reposition patient  - Assist with mobility/ambulation  - Relieve pressure over bony prominences  - Avoid friction and shearing  - Provide appropriate hygiene as needed including keeping skin clean and dry  - Evaluate need for skin moisturizer/barrier cream  - Collaborate with interdisciplinary team (i e  Nutrition, Rehabilitation, etc )   - Patient/family teaching  Outcome: Progressing  Goal: Incision(s), wounds(s) or drain site(s) healing without S/S of infection  Description  INTERVENTIONS  - Assess and document risk factors for skin impairment   - Assess and document dressing, incision, wound bed, drain sites and surrounding tissue  - Consider nutrition services referral as needed  - Oral mucous membranes remain intact  - Provide patient/ family education  Outcome: Progressing  Goal: Oral mucous membranes remain intact  Description  INTERVENTIONS  - Assess oral mucosa and hygiene practices  - Implement preventative oral hygiene regimen  - Implement oral medicated treatments as ordered  - Initiate Nutrition services referral as needed  Outcome: Progressing     Problem: Prexisting or High Potential for Compromised Skin Integrity  Goal: Skin integrity is maintained or improved  Description  INTERVENTIONS:  - Identify patients at risk for skin breakdown  - Assess and monitor skin integrity  - Assess and monitor nutrition and hydration status  - Monitor labs   - Assess for incontinence   - Turn and reposition patient  - Assist with mobility/ambulation  - Relieve pressure over bony prominences  - Avoid friction and shearing  - Provide appropriate hygiene as needed including keeping skin clean and dry  - Evaluate need for skin moisturizer/barrier cream  - Collaborate with interdisciplinary team   - Patient/family teaching  - Consider wound care consult   Outcome: Progressing

## 2019-11-28 NOTE — OCCUPATIONAL THERAPY NOTE
Occupational Therapy Cancellation Note        Patient Name: Sima Martínez  ORFBR'K Date: 11/28/2019    Order received, chart review completed  Spoke with RN, Maurice Urena  RN stated therapy could attempt although d/t severity of deficits, it was recommended that Pt does not sit OOB in recliner  Therapist approached Pt, Pt responding to verbal stimuli and would open eyse on command although very lethargic  Requiring Max vc'ing to keep eyes open and attempt to participate in evaluation  Pt not appropriate for therapy intervention at this time  Will return as schedule allows and as medically appropriate      Monie Curtis, OTR/L

## 2019-11-29 ENCOUNTER — APPOINTMENT (INPATIENT)
Dept: NON INVASIVE DIAGNOSTICS | Facility: HOSPITAL | Age: 64
DRG: 064 | End: 2019-11-29
Payer: MEDICARE

## 2019-11-29 ENCOUNTER — APPOINTMENT (INPATIENT)
Dept: RADIOLOGY | Facility: HOSPITAL | Age: 64
DRG: 064 | End: 2019-11-29
Payer: MEDICARE

## 2019-11-29 PROBLEM — D68.59 HYPERCOAGULABLE STATE (HCC): Status: ACTIVE | Noted: 2019-11-29

## 2019-11-29 LAB
ANION GAP SERPL CALCULATED.3IONS-SCNC: 8 MMOL/L (ref 4–13)
BASOPHILS # BLD AUTO: 0.02 THOUSANDS/ΜL (ref 0–0.1)
BASOPHILS NFR BLD AUTO: 0 % (ref 0–1)
BUN SERPL-MCNC: 20 MG/DL (ref 5–25)
CALCIUM SERPL-MCNC: 7.7 MG/DL (ref 8.3–10.1)
CHLORIDE SERPL-SCNC: 111 MMOL/L (ref 100–108)
CK MB SERPL-MCNC: 1.3 % (ref 0–2.5)
CK MB SERPL-MCNC: 34.9 NG/ML (ref 0–5)
CK SERPL-CCNC: 2712 U/L (ref 26–192)
CO2 SERPL-SCNC: 28 MMOL/L (ref 21–32)
CREAT SERPL-MCNC: 0.53 MG/DL (ref 0.6–1.3)
EOSINOPHIL # BLD AUTO: 0.21 THOUSAND/ΜL (ref 0–0.61)
EOSINOPHIL NFR BLD AUTO: 2 % (ref 0–6)
ERYTHROCYTE [DISTWIDTH] IN BLOOD BY AUTOMATED COUNT: 14.4 % (ref 11.6–15.1)
ERYTHROCYTE [DISTWIDTH] IN BLOOD BY AUTOMATED COUNT: 14.9 % (ref 11.6–15.1)
GFR SERPL CREATININE-BSD FRML MDRD: 99 ML/MIN/1.73SQ M
GLUCOSE SERPL-MCNC: 146 MG/DL (ref 65–140)
GLUCOSE SERPL-MCNC: 155 MG/DL (ref 65–140)
HCT VFR BLD AUTO: 24.5 % (ref 34.8–46.1)
HCT VFR BLD AUTO: 27.4 % (ref 34.8–46.1)
HGB BLD-MCNC: 7.5 G/DL (ref 11.5–15.4)
HGB BLD-MCNC: 8.6 G/DL (ref 11.5–15.4)
IMM GRANULOCYTES # BLD AUTO: 0.48 THOUSAND/UL (ref 0–0.2)
IMM GRANULOCYTES NFR BLD AUTO: 4 % (ref 0–2)
LYMPHOCYTES # BLD AUTO: 1.66 THOUSANDS/ΜL (ref 0.6–4.47)
LYMPHOCYTES NFR BLD AUTO: 15 % (ref 14–44)
MAGNESIUM SERPL-MCNC: 1.9 MG/DL (ref 1.6–2.6)
MCH RBC QN AUTO: 30.4 PG (ref 26.8–34.3)
MCH RBC QN AUTO: 30.4 PG (ref 26.8–34.3)
MCHC RBC AUTO-ENTMCNC: 30.6 G/DL (ref 31.4–37.4)
MCHC RBC AUTO-ENTMCNC: 31.4 G/DL (ref 31.4–37.4)
MCV RBC AUTO: 97 FL (ref 82–98)
MCV RBC AUTO: 99 FL (ref 82–98)
MONOCYTES # BLD AUTO: 1.17 THOUSAND/ΜL (ref 0.17–1.22)
MONOCYTES NFR BLD AUTO: 10 % (ref 4–12)
NEUTROPHILS # BLD AUTO: 7.67 THOUSANDS/ΜL (ref 1.85–7.62)
NEUTS SEG NFR BLD AUTO: 69 % (ref 43–75)
NRBC BLD AUTO-RTO: 0 /100 WBCS
PHOSPHATE SERPL-MCNC: 1.9 MG/DL (ref 2.3–4.1)
PLATELET # BLD AUTO: 83 THOUSANDS/UL (ref 149–390)
PLATELET # BLD AUTO: 96 THOUSANDS/UL (ref 149–390)
PMV BLD AUTO: 11.6 FL (ref 8.9–12.7)
PMV BLD AUTO: 12.4 FL (ref 8.9–12.7)
POTASSIUM SERPL-SCNC: 3.7 MMOL/L (ref 3.5–5.3)
RBC # BLD AUTO: 2.47 MILLION/UL (ref 3.81–5.12)
RBC # BLD AUTO: 2.83 MILLION/UL (ref 3.81–5.12)
SODIUM SERPL-SCNC: 147 MMOL/L (ref 136–145)
WBC # BLD AUTO: 11.21 THOUSAND/UL (ref 4.31–10.16)
WBC # BLD AUTO: 14.76 THOUSAND/UL (ref 4.31–10.16)

## 2019-11-29 PROCEDURE — 85027 COMPLETE CBC AUTOMATED: CPT | Performed by: INTERNAL MEDICINE

## 2019-11-29 PROCEDURE — 99233 SBSQ HOSP IP/OBS HIGH 50: CPT | Performed by: INTERNAL MEDICINE

## 2019-11-29 PROCEDURE — 83735 ASSAY OF MAGNESIUM: CPT | Performed by: INTERNAL MEDICINE

## 2019-11-29 PROCEDURE — 82550 ASSAY OF CK (CPK): CPT | Performed by: INTERNAL MEDICINE

## 2019-11-29 PROCEDURE — 82553 CREATINE MB FRACTION: CPT | Performed by: INTERNAL MEDICINE

## 2019-11-29 PROCEDURE — 93799 UNLISTED CV SVC/PROCEDURE: CPT

## 2019-11-29 PROCEDURE — 84100 ASSAY OF PHOSPHORUS: CPT | Performed by: INTERNAL MEDICINE

## 2019-11-29 PROCEDURE — 74230 X-RAY XM SWLNG FUNCJ C+: CPT

## 2019-11-29 PROCEDURE — 92526 ORAL FUNCTION THERAPY: CPT

## 2019-11-29 PROCEDURE — 93306 TTE W/DOPPLER COMPLETE: CPT

## 2019-11-29 PROCEDURE — 82948 REAGENT STRIP/BLOOD GLUCOSE: CPT

## 2019-11-29 PROCEDURE — 85025 COMPLETE CBC W/AUTO DIFF WBC: CPT | Performed by: INTERNAL MEDICINE

## 2019-11-29 PROCEDURE — 80048 BASIC METABOLIC PNL TOTAL CA: CPT | Performed by: INTERNAL MEDICINE

## 2019-11-29 PROCEDURE — 92611 MOTION FLUOROSCOPY/SWALLOW: CPT

## 2019-11-29 RX ADMIN — ENOXAPARIN SODIUM 40 MG: 40 INJECTION SUBCUTANEOUS at 09:46

## 2019-11-29 RX ADMIN — ASPIRIN 300 MG: 300 SUPPOSITORY RECTAL at 10:37

## 2019-11-29 RX ADMIN — SODIUM BICARBONATE 50 ML/HR: 84 INJECTION, SOLUTION INTRAVENOUS at 09:46

## 2019-11-29 RX ADMIN — INSULIN LISPRO 1 UNITS: 100 INJECTION, SOLUTION INTRAVENOUS; SUBCUTANEOUS at 08:36

## 2019-11-29 NOTE — PHYSICAL THERAPY NOTE
Eval deferred, plan is now likely for comfort measures only  Will follow and see for evaluation should it become medically appropriate    Tiffanie Gutierrez PT, DPT CSRS

## 2019-11-29 NOTE — CONSULTS
Consult received for ischemic stroke  Pt with multiple liver lesions of metastatic malignancy, admitted for ischemic stroke  Per SLP, NPO recommended, no plans for TF at this time  Next of kin desires comfort care measures per hospitalist note, palliative care note not placed at this time  Did not meet with patient at this time, awaiting POC  Diet ed for stroke not appropriate  Remains available for TF recommendations  Will follow up

## 2019-11-29 NOTE — ASSESSMENT & PLAN NOTE
Admitted for ischemic stroke after patient found on the ground with weakness   MCA and PCA territory ischemia on CT, rule out embolic stroke  MRI also showed: Numerous supra and infratentorial infarcts are identified likely representing embolic phenomenon  The largest infarcts in the right MCA is to be seen posteriorly involving the right temporal occipital lobes as well as the posterior insular cortex, and   right parietal cortex  Continue aspirin and statin, permissive hypertension and Keep blood pressure less than 220/120  DVT prophylaxis with 40 mg and Lovenox  MRA carotid, echocardiogram, telemetry  Appreciate speech and swallow eval, suggested NPO, video fluoroscopy to follow  Patient continued on aspirin only due to significant risk of bleeding  Neurology recommendation appreciated  Patient has no capacity for medical decision as per my evaluation, formal capacity assessment by Psychiatry awaiting    Discussed goal of care with patient's next of kin who is the brother and a sister and decided for DNR/DNI comfort care

## 2019-11-29 NOTE — PROGRESS NOTES
Progress Note - Bernard Suresh 1955, 75 y o  female MRN: [id number]    Unit/Bed#: -01 Encounter: [id number]    Primary Care Provider: No primary care provider on file  Date and time admitted to hospital: 11/27/2019  9:18 AM    Acute hypoxic respiratory POA most likely secondary to PE  Patient presented with hypoxia desaturating to mid 60, responded to oxygen  * Ischemic stroke [facility])  Assessment & Plan  Admitted for ischemic stroke after patient found on the ground with weakness   MCA and PCA territory ischemia on CT, rule out embolic stroke  MRI also showed: Numerous supra and infratentorial infarcts are identified likely representing embolic phenomenon  The largest infarcts in the right MCA is to be seen posteriorly involving the right temporal occipital lobes as well as the posterior insular cortex, and   right parietal cortex  Continue aspirin and statin, permissive hypertension and Keep blood pressure less than 220/120  DVT prophylaxis with 40 mg and Lovenox  MRA carotid, echocardiogram, telemetry  Appreciate speech and swallow eval, suggested NPO, video fluoroscopy to follow  Patient continued on aspirin only due to significant risk of bleeding  Neurology recommendation appreciated  Patient has no capacity for medical decision as per my evaluation, formal capacity assessment by Psychiatry awaiting  Discussed goal of care with patient's next of kin who is the brother and a sister and they decided for DNR/DNI comfort care    Discussed with the patient regarding her situation and showed understanding  I revealed the imaging finding as most likely pancreatic cancer with metastasis associated with hypercoagulable state leading to pulmonary embolus and ischemic stroke  Patient's decision making capacity assessed and patient was able to understand fully about her diagnosis, answered correctly all questions and clearly stated her wishes, patient was alert oriented to time, place and time  she explicitly affirmed her desires and expressed that she doesn't want aggressive measures and wants to be comforted  Options including Oncology evaluation, anticoagulation were raised and patient clearly stated she doesn't want any invasive diagnostic measures  I also confirmed patient's wish regarding advance directives and patient chose DNR, DNI, no feeding tube, no dialysis, no artificial feeding, no antibiotics, no invasive procedure, no surgery, no chemotherapy, no blood transfusion  Patient's next of kin who's her brother and sister were by the bedside assisting the discussion  Hypercoagulable state [facility])  Assessment & Plan  Hypercoagulable state presented with ischemic stroke, PE, bilateral proximal DVT  Likely primary pancreatic mass with metastasis to the liver, splenic vein, kidney  Anticoagulation deferred due to increased risk of bleeding  Family opt for comfort care  Multiple lesions of metastatic malignancy ([facility] [address] )  Assessment & Plan  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, splenic vein, kidney metastasis     Acute renal failure (ARF) ([facility] [address] )  Assessment & Plan  Presented with CHRISTELLE now improved  Continue to monitor    Rhabdomyolysis  Assessment & Plan  Patient on the ground for unknown duration  CK levels showing improvement  Follow CK level, serum electrolytes, renal function  Appreciate Nephrology recommendations    Elevated transaminase level  Assessment & Plan  Elevated AST, ALT and bilirubin  Most likely due to metastatic lesions    Aspiration pneumonia (HCC)  Assessment & Plan  Presented with hypoxia, leukocytosis, chest x-ray finding of upper lobe infiltrate  Broad-spectrum IV antibiotics    Hypoxia  Assessment & Plan  Patient was found with hypoxic respiratory failure in ED which responded with home oxygen as all cannula  Most likely secondary to PE    Anticoagulation deferred as patient's family decided for DNR/DNR/comfort care    VTE Pharmacologic Prophylaxis: Pharmacologic: Enoxaparin (Lovenox)    Patient Centered Rounds: I have performed bedside rounds with nursing staff today    Discussions with Specialists or Other Care Team Provider:     Education and Discussions with Family / Patient:     Current Length of Stay: 2 day(s)    Current Patient Status: Inpatient   Certification Statement: The patient will continue to require additional inpatient hospital stay due to Hospice care placement    Discharge Plan:  Probably tomorrow    Code Status: Level 3 - DNAR and DNI      Subjective:   Unable to get history from patient  No events overnight  Objective:     Vitals:   Temp (24hrs), Av 7 °F (37 6 °C), Min:98 7 °F (37 1 °C), Max:100 °F (37 8 °C)    Temp:  [98 7 °F (37 1 °C)-100 °F (37 8 °C)] 99 8 °F (37 7 °C)  HR:  [] 98  Resp:  [28-32] 32  BP: (105-149)/(66-79) 110/72  SpO2:  [97 %-100 %] 100 %  Body mass index is 20 79 kg/m²  Input and Output Summary (last 24 hours):        Intake/Output Summary (Last 24 hours) at 2019 1041  Last data filed at 2019 0529  Gross per 24 hour   Intake 1132 08 ml   Output 1250 ml   Net -117 92 ml       Physical Exam:     General appearance: alert  Head: Normocephalic, without obvious abnormality, atraumatic  Lungs: clear to auscultation bilaterally  Heart: regular rate and rhythm  Abdomen: soft, non-tender, positive bowel sounds   Back: negative  Extremities: Bilateral toe and finger tip cyanosis, radial artery possible  Neurologic: Mental status: alertness: alert    Additional Data:     Labs:    Results from last 7 days   Lab Units 19  0700 19  0430  19  0938   WBC Thousand/uL 14 76* 11 21*   < > 14 01*   HEMOGLOBIN g/dL 8 6* 7 5*   < > 11 0*   HEMATOCRIT % 27 4* 24 5*   < > 35 3   PLATELETS Thousands/uL 96* 83*   < > 49*   BANDS PCT %  --   --   --  6   NEUTROS PCT %  --  69   < >  --    LYMPHS PCT %  --  15   < >  --    LYMPHO PCT %  --   --   --  9*   MONOS PCT %  --  10   < >  --    MONO PCT % --   --   --  3*   EOS PCT %  --  2   < > 0    < > = values in this interval not displayed  Results from last 7 days   Lab Units 11/29/19  0700  11/27/19  0938   SODIUM mmol/L 147*   < > 137   POTASSIUM mmol/L 3 7   < > 5 1   CHLORIDE mmol/L 111*   < > 102   CO2 mmol/L 28   < > 22   BUN mg/dL 20   < > 73*   CREATININE mg/dL 0 53*   < > 1 69*   ANION GAP mmol/L 8   < > 13   CALCIUM mg/dL 7 7*   < > 7 9*   ALBUMIN g/dL  --   --  3 5   TOTAL BILIRUBIN mg/dL  --   --  2 17*   ALK PHOS U/L  --   --  184*   ALT U/L  --   --  2,733*   AST U/L  --   --  7,506*   GLUCOSE RANDOM mg/dL 146*   < > 352*    < > = values in this interval not displayed  Results from last 7 days   Lab Units 11/28/19  0446   INR  1 77*     Results from last 7 days   Lab Units 11/29/19  0659 11/28/19  2341 11/28/19  1710 11/28/19  1135 11/27/19  2221 11/27/19  1719 11/27/19  1707 11/27/19  1703 11/27/19  0934 11/27/19  0921   POC GLUCOSE mg/dl 155* 155* 145* 125 123 228* >500* <20* 315* >500*     Results from last 7 days   Lab Units 11/28/19  0446   HEMOGLOBIN A1C % 5 7     Results from last 7 days   Lab Units 11/27/19  1727 11/27/19  0938   LACTIC ACID mmol/L 2 0 3 5*           * I Have Reviewed All Lab Data Listed Above  * Additional Pertinent Lab Tests Reviewed:  [facility] Admission Reviewed    Imaging:    Imaging Reports Reviewed Today Include: images reviewed    Recent Cultures (last 7 days):           Last 24 Hours Medication List:     Current Facility-Administered Medications:  aspirin 300 mg Rectal Daily Lorayne Gaucher, MD    atorvastatin 40 mg Oral QPM Lorayne Gaucher, MD    cefTRIAXone 1,000 mg Intravenous Q24H Lorayne Gaucher, MD Last Rate: 1,000 mg (11/28/19 1513)   enoxaparin 40 mg Subcutaneous Daily Lorayne Gaucher, MD    influenza vaccine 0 5 mL Intramuscular Prior to discharge Lorayne Gaucher, MD    insulin lispro 1-5 Units Subcutaneous TID AC SANFORD Moore    insulin lispro 1-5 Units Subcutaneous HS SANFORD Moore nicotine 1 patch Transdermal Daily Benji Tavares MD    pneumococcal 13-valent conjugate vaccine 0 5 mL Intramuscular Prior to discharge Benji Tavares MD    sodium bicarbonate infusion 50 mL/hr Intravenous Continuous Sydna Bosworth, DO Last Rate: 50 mL/hr (11/29/19 0946)        Today, Patient Was Seen By: Benji Tavares MD    ** Please Note: Dictation voice to text software may have been used in the creation of this document   **

## 2019-11-29 NOTE — ASSESSMENT & PLAN NOTE
Hypercoagulable state presented with ischemic stroke, PE, bilateral proximal DVT  Likely primary pancreatic mass with metastasis to the liver, splenic vein, kidney  Anticoagulation deferred due to increased risk of bleeding  Family opt for comfort care

## 2019-11-29 NOTE — ASSESSMENT & PLAN NOTE
Patient was found with hypoxic respiratory failure in ED which responded with home oxygen as all cannula  Most likely secondary to PE    Anticoagulation deferred as patient's family decided for DNR/DNR/comfort care

## 2019-11-29 NOTE — ASSESSMENT & PLAN NOTE
Patient on the ground for unknown duration  CK levels showing improvement    Follow CK level, serum electrolytes, renal function  Appreciate Nephrology recommendations

## 2019-11-30 VITALS
TEMPERATURE: 98.1 F | RESPIRATION RATE: 18 BRPM | BODY MASS INDEX: 20.77 KG/M2 | OXYGEN SATURATION: 99 % | DIASTOLIC BLOOD PRESSURE: 80 MMHG | HEART RATE: 96 BPM | SYSTOLIC BLOOD PRESSURE: 120 MMHG | WEIGHT: 110.01 LBS | HEIGHT: 61 IN

## 2019-11-30 PROBLEM — R09.02 HYPOXIA: Status: RESOLVED | Noted: 2019-11-27 | Resolved: 2019-11-30

## 2019-11-30 PROBLEM — R74.01 ELEVATED TRANSAMINASE LEVEL: Status: RESOLVED | Noted: 2019-11-27 | Resolved: 2019-11-30

## 2019-11-30 PROBLEM — J69.0 ASPIRATION PNEUMONIA (HCC): Status: RESOLVED | Noted: 2019-11-28 | Resolved: 2019-11-30

## 2019-11-30 PROBLEM — R23.0 EXTREMITY CYANOSIS: Status: ACTIVE | Noted: 2019-11-30

## 2019-11-30 PROBLEM — M62.82 RHABDOMYOLYSIS: Status: RESOLVED | Noted: 2019-11-27 | Resolved: 2019-11-30

## 2019-11-30 PROBLEM — N17.9 ACUTE RENAL FAILURE (ARF) (HCC): Status: RESOLVED | Noted: 2019-11-27 | Resolved: 2019-11-30

## 2019-11-30 PROCEDURE — 99239 HOSP IP/OBS DSCHRG MGMT >30: CPT | Performed by: INTERNAL MEDICINE

## 2019-11-30 RX ORDER — OXYCODONE HYDROCHLORIDE 10 MG/1
10 TABLET ORAL EVERY 4 HOURS PRN
Refills: 0
Start: 2019-11-30 | End: 2019-12-10

## 2019-11-30 RX ORDER — NICOTINE 21 MG/24HR
1 PATCH, TRANSDERMAL 24 HOURS TRANSDERMAL DAILY
Qty: 28 PATCH | Refills: 0
Start: 2019-12-01

## 2019-11-30 RX ORDER — SENNOSIDES 8.6 MG
650 CAPSULE ORAL EVERY 8 HOURS PRN
Qty: 30 TABLET | Refills: 0
Start: 2019-11-30

## 2019-11-30 RX ORDER — NICOTINE 21 MG/24HR
1 PATCH, TRANSDERMAL 24 HOURS TRANSDERMAL DAILY
Qty: 28 PATCH | Refills: 0 | Status: SHIPPED | OUTPATIENT
Start: 2019-12-01 | End: 2019-11-30

## 2019-11-30 NOTE — ASSESSMENT & PLAN NOTE
Admitted for ischemic stroke after patient found on the ground with weakness   MCA and PCA territory ischemia on CT, rule out embolic stroke  MRI also showed: Numerous supra and infratentorial infarcts are identified likely representing embolic phenomenon  The largest infarcts in the right MCA is to be seen posteriorly involving the right temporal occipital lobes as well as the posterior insular cortex, and right parietal cortex  VF swallow study done and level 6 soft and bite size with level 2 mildly thick liquids, Aspiration precautions suggested  Patient opted for comfort care so medications discontinued

## 2019-11-30 NOTE — ASSESSMENT & PLAN NOTE
Hypercoagulable state presented with ischemic stroke, PE, bilateral proximal DVT  Likely primary pancreatic mass with metastasis to the liver, splenic vein, kidney  Anticoagulation deferred due to increased risk of bleeding  Family opt for comfort care  Discussed with the patient regarding her situation and showed understanding  I revealed the imaging finding as most likely pancreatic cancer with metastasis associated with hypercoagulable state leading to pulmonary embolus and ischemic stroke  Patient's decision making capacity assessed and patient was able to understand fully about her diagnosis, answered correctly all questions and clearly stated her wishes, patient was alert oriented to time, place and time  she explicitly affirmed her desires and expressed that she doesn't want aggressive measures and wants to be comforted  Options including Oncology evaluation, anticoagulation were raised and patient clearly stated she doesn't want any invasive diagnostic measures  I also confirmed patient's wish regarding advance directives and patient chose DNR, DNI, no feeding tube, no dialysis, no artificial feeding, no antibiotics, no invasive procedure, no surgery, no chemotherapy, no blood transfusion   Patient's next of kin who's her brother and sister were by the bedside assisting the discussion

## 2019-11-30 NOTE — DISCHARGE SUMMARY
Discharge- Brittany Manjarrez 1955, 75 y o  female MRN: [id number]    Unit/Bed#: -01 Encounter: [id number]    Primary Care Provider: No primary care provider on file  Date and time admitted to hospital: 11/27/2019  9:18 AM      * Ischemic stroke [facility])  Assessment & Plan  Admitted for ischemic stroke after patient found on the ground with weakness   MCA and PCA territory ischemia on CT, rule out embolic stroke  MRI also showed: Numerous supra and infratentorial infarcts are identified likely representing embolic phenomenon  The largest infarcts in the right MCA is to be seen posteriorly involving the right temporal occipital lobes as well as the posterior insular cortex, and right parietal cortex  VF swallow study done and level 6 soft and bite size with level 2 mildly thick liquids, Aspiration precautions suggested  Patient opted for comfort care so medications discontinued  Hypercoagulable state [facility])  Assessment & Plan  Hypercoagulable state presented with ischemic stroke, PE, bilateral proximal DVT  Likely primary pancreatic mass with metastasis to the liver, splenic vein, kidney  Anticoagulation deferred due to increased risk of bleeding  Family opt for comfort care  Discussed with the patient regarding her situation and showed understanding  I revealed the imaging finding as most likely pancreatic cancer with metastasis associated with hypercoagulable state leading to pulmonary embolus and ischemic stroke  Patient's decision making capacity assessed and patient was able to understand fully about her diagnosis, answered correctly all questions and clearly stated her wishes, patient was alert oriented to time, place and time  she explicitly affirmed her desires and expressed that she doesn't want aggressive measures and wants to be comforted   Options including Oncology evaluation, anticoagulation were raised and patient clearly stated she doesn't want any invasive diagnostic measures  I also confirmed patient's wish regarding advance directives and patient chose DNR, DNI, no feeding tube, no dialysis, no artificial feeding, no antibiotics, no invasive procedure, no surgery, no chemotherapy, no blood transfusion  Patient's next of kin who's her brother and sister were by the bedside assisting the discussion    Multiple lesions of metastatic malignancy [facility])  Assessment & Plan  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, splenic vein, kidney metastasis     Extremity cyanosis  Assessment & Plan  Could be multifactorial; PVD, emboli, prolonged hypoxia  Elevate the arm, pain management    Aspiration pneumonia (HCC)-resolved as of 11/30/2019  Assessment & Plan  Antibiotics de      Discharging Physician / Practitioner: Juan Martinez MD  PCP: No primary care provider on file  Admission Date:   Admission Orders (From admission, onward)     Ordered        11/27/19 1023  Inpatient Admission (expected length of stay for this patient Order details is greater than two midnights)  Once                   Discharge Date: 11/30/19    Disposition:      Other: Early Slight     For Discharges to [facility] SNF:   · Not Applicable to this Patient - Not Applicable to this Patient    Reason for Admission:  Stroke, hypercoagulable state    Discharge Diagnoses:     Please see assessment and plan section above for further details regarding discharge diagnoses       Resolved Problems  Date Reviewed: 11/27/2019          Resolved    Rhabdomyolysis 11/30/2019     Resolved by  Juan Martinez MD    Acute renal failure (ARF) ([facility] [address] ) 11/30/2019     Resolved by  Juan Martinez MD    Hypoxia 11/30/2019     Resolved by  Juan Martinez MD    Elevated transaminase level 11/30/2019     Resolved by  Juan Martinez MD    Aspiration pneumonia ([facility] [address] ) 11/30/2019     Resolved by  Juan Martinez MD          Consultations During Hospital Stay:  IP CONSULT TO NEPHROLOGY  IP CONSULT TO PHYSICAL MEDICINE REHAB  IP CONSULT TO NUTRITION SERVICES  IP CONSULT TO NEUROLOGY  IP CONSULT TO CASE MANAGEMENT     Procedures Performed:   * No surgery found *      Xr Chest 1 View Portable    Result Date: 11/27/2019  Narrative: CHEST INDICATION:   ams  COMPARISON:  None EXAM PERFORMED/VIEWS:  XR CHEST PORTABLE FINDINGS: The cardiac silhouette is mildly enlarged  The lungs are clear  No pneumothorax or pleural effusion  Osseous structures appear within normal limits for patient age  Impression: No acute cardiopulmonary disease  Workstation performed: [id number]     Ct Head Without Contrast    Result Date: 11/27/2019  Narrative: CT BRAIN - WITHOUT CONTRAST INDICATION:   Altered mental status  COMPARISON:  None  TECHNIQUE:  CT examination of the brain was performed  In addition to axial images, coronal 2D reformatted images were created and submitted for interpretation  Radiation dose length product (DLP) for this visit:  827 mGy-cm   This examination, like all CT scans performed in the [facility], was performed utilizing techniques to minimize radiation dose exposure, including the use of iterative reconstruction and automated exposure control  IMAGE QUALITY:  Diagnostic  FINDINGS: PARENCHYMA:  Cytotoxic edema is identified in the right MCA and PCA territories consistent with evolving infarcts  Mild global atrophy is identified  Chronic microvascular ischemic change is present  No hemorrhage is identified  VENTRICLES AND EXTRA-AXIAL SPACES:  Normal for the patient's age  VISUALIZED ORBITS AND PARANASAL SINUSES:  Unremarkable  CALVARIUM AND EXTRACRANIAL SOFT TISSUES:  Normal      Impression: Evolving right MCA and PCA territory infarcts  I personally discussed this study with Shell Leos on 11/27/2019 at 10:03 AM  Workstation performed: [id number]     Ct Chest Wo Contrast    Result Date: 11/28/2019  Narrative: CT CHEST WITHOUT IV CONTRAST INDICATION: 76-year-old female with left-sided body weakness and fascial droop  Multiple acute infarcts seen on prior brain MRI, likely related to embolic phenomenon  Prior ultrasound revealed possible pancreatic lesion with liver metastasis  Evaluate  for metastatic disease  COMPARISON:  X-ray chest dated November 27, 2019, MRI brain and right upper quadrant abdominal ultrasound dated November 27, 2019  TECHNIQUE: CT examination of the chest was performed without intravenous contrast   Axial, sagittal, and coronal 2D reformatted images were created from the source data and submitted for interpretation  Radiation dose length product (DLP) for this visit:  226 mGy-cm   This examination, like all CT scans performed in the [facility], was performed utilizing techniques to minimize radiation dose exposure, including the use of iterative reconstruction and automated exposure control  FINDINGS: LUNGS:  The central airways are patent  There is patchy right lower lobe opacity, likely reflecting atelectasis, pneumonia or sequela of aspiration (series 3 image 96)  There is an ill-defined opacity in the right upper lobe measuring 1 3 x 0 6 cm (series 601 image 63, series 6 image 62), which may be related to infectious/inflammatory process  However, follow-up to resolution is recommended to exclude underlying lesion  There is 6 mm nodule in the left costophrenic sulcus (series 3 image 102)  PLEURA:  No pleural effusion or pneumothorax  HEART/GREAT VESSELS:  Mild cardiomegaly  No pericardial effusion  Coronary artery calcifications are present  Normal caliber and course of the great vessels  MEDIASTINUM AND CEDRICK:  Unremarkable  CHEST WALL AND LOWER NECK:   Unremarkable  VISUALIZED STRUCTURES IN THE UPPER ABDOMEN:  Numerous ill-defined hypoattenuating liver lesions and the right and left hepatic lobes  A representative lesion in segment 7 measures 4 3 x 3 7 cm (series 2 image 57)  The pancreas is not adequately included on the field-of-view   OSSEOUS STRUCTURES:  No acute fracture or destructive osseous lesion  Impression: 1  Patchy right lower lobe opacity, likely reflecting atelectasis, pneumonia or sequela of aspiration  2   1 3 x 0 6 cm ill-defined right upper lobe opacity, which may be related to infectious/inflammatory process  However, follow-up chest CT in 3 months is recommended to evaluate for resolution/interval change  3   6 mm nodule at the left costophrenic sulcus  This can also be reassessed on follow-up imaging  4   Numerous ill-defined hypoattenuating liver lesions, similar to the prior ultrasound  Further characterization with dedicated contrast-enhanced liver protocol MRI is recommended  The study was marked in EPIC for significant notification  Workstation performed: [id number]     Mra Head Wo Contrast    Result Date: 11/27/2019  Narrative: MRA BRAIN WITHOUT CONTRAST INDICATION:  stroke COMPARISON:  None  TECHNIQUE:  Axial 3-D time-of-flight imaging with 3-D reconstructions performed without contrast    IV Contrast:  Not administered  FINDINGS: IMAGE QUALITY:  Diagnostic  ANATOMY INTERNAL CAROTID ARTERIES:  Normal flow related enhancement of the distal cervical, petrous and cavernous segments of the internal carotid arteries  Normal ICA terminus  ANTERIOR CIRCULATION:  Normal A1 segments  Normal anterior communicating artery  Normal flow-related enhancement of the anterior cerebral arteries  MIDDLE CEREBRAL ARTERY CIRCULATION:  The M1 segment and middle cerebral artery branches demonstrate normal flow-related enhancement  There is cut off of an M2 branch on the right identified for the large MCA infarct  DISTAL VERTEBRAL ARTERIES:  Distal vertebral arteries are patient with a normal vertebrobasilar junction  The posterior inferior cerebellar artery origins are normal  BASILAR ARTERY:  Normal  POSTERIOR CEREBRAL ARTERIES:  Both posterior cerebral arteries arises from the basilar tip  Both arteries demonstrate normal flow-related enhancement       Impression: Right M2 branch cut off accounts for the large MCA infarct  Otherwise unremarkable MRA  Workstation performed: [id number]     Mri Brain Wo Contrast    Result Date: 11/27/2019  Narrative: MRI BRAIN WITHOUT CONTRAST INDICATION: stroke  COMPARISON:   CT brain earlier the same day TECHNIQUE:  Sagittal T1, axial T2, axial FLAIR, axial T1, axial [place] and axial diffusion imaging  IMAGE QUALITY:  Diagnostic  FINDINGS: BRAIN PARENCHYMA:  Bilateral supra and infratentorial infarcts are identified the largest involves the right middle cerebral artery with involvement of the medial temporal lobe, right occipital lobe, posterior insular cortex, and right parietal lobe  There are innumerable smaller infarcts identified in the right frontal lobe, left hemisphere, and bilateral cerebellar hemispheres  Embolic phenomenon should be considered  Some areas of gyriform T1 hyperintensity in the right occipital lobe compatible  with nonhemorrhagic laminar necrosis  Small scattered hyperintensities on T2/FLAIR imaging are noted in the periventricular and subcortical white matter demonstrating an appearance that is statistically most likely to represent mild microangiopathic change  VENTRICLES:  Normal for the patient's age  SELLA AND PITUITARY GLAND:  Normal  ORBITS:  Normal  PARANASAL SINUSES:  Normal  VASCULATURE:  Evaluation of the major intracranial vasculature demonstrates appropriate flow voids  CALVARIUM AND SKULL BASE:  Normal  EXTRACRANIAL SOFT TISSUES:  Normal      Impression: Numerous supra and infratentorial infarcts are identified likely representing embolic phenomenon  The largest infarcts in the right MCA is to be seen posteriorly involving the right temporal occipital lobes as well as the posterior insular cortex, and right parietal cortex  Workstation performed: [id number]      Right Upper Quadrant    Result Date: 11/27/2019  Narrative: RIGHT UPPER QUADRANT ULTRASOUND INDICATION:   Elevated AST ALT bilirubin  COMPARISON:  None TECHNIQUE:   Real-time ultrasound of the right upper quadrant was performed with a curvilinear transducer with both volumetric sweeps and still imaging techniques  FINDINGS: PANCREAS:  2 4 x 1 4 cm pancreatic tail mass suspected  AORTA AND IVC:  Visualized portions are normal for patient age  LIVER: Size:  Within normal range  The liver measures 16 2 cm in the midclavicular line  Contour:  Surface contour is smooth  Parenchyma:  Echogenicity and echotexture are within normal limits  Extensive hepatic lesions are identified  Although nonspecific, findings are concerning for possible metastases  The largest lesion in the right hepatic lobe measures 6 1 x 5 6 x 4 9 cm  Limited imaging of the main portal vein shows it to be patent and hepatopetal   BILIARY: The gallbladder is normal in caliber  No wall thickening or pericholecystic fluid  Sludge is identified  No sonographic Dutton's sign  No intrahepatic biliary dilatation  CBD measures 2 mm  No choledocholithiasis  KIDNEY: Right kidney measures 11 3 x 5 1 cm  Within normal limits  2 2 x 1 4 x 2 4 cm soft tissue nodule anterior to the right kidney  ASCITES:   Trace perihepatic free fluid  Impression: 1  2 4 x 1 4 cm pancreatic tail mass suspected  2  Extensive hepatic lesions suspicious for metastases  3  2 4 cm soft tissue nodule anterior to the right kidney, also possibly metastasis  4  Gallbladder sludge without wall thickening, shadowing gallstones or ultrasonographic Dutton's sign  5  Trace perihepatic free fluid  Given patient's elevated BUN/creatinine, noncontrast MRI abdomen would be recommended for further assessment and provide additional information for potential biopsy  Depending on patient's clinical (neurologic) status, noncontrast CT imaging may be a less helpful alternative    I personally discussed this study with Merna Coyle on 11/27/2019 at 5:19 PM   Workstation performed: [id number]     Ct Abdomen Pelvis W Contrast    Result Date: 11/28/2019  Narrative: CT ABDOMEN AND PELVIS WITH IV CONTRAST INDICATION: 76-year-old female with recent stroke, and questionable pancreatic tail lesion as well as several liver lesions seen on a prior ultrasound  COMPARISON:  Right upper quadrant abdominal ultrasound dated November 27, 2019  TECHNIQUE:  CT examination of the abdomen and pelvis was performed  In addition to portal venous phase postcontrast scanning through the abdomen and pelvis, delayed phase postcontrast scanning was performed through the upper abdominal viscera  Axial, sagittal, and coronal 2D reformatted images were created from the source data and submitted for interpretation  Radiation dose length product (DLP) for this visit:  966 mGy-cm   This examination, like all CT scans performed in the [facility], was performed utilizing techniques to minimize radiation dose exposure, including the use of iterative reconstruction and automated exposure control  IV Contrast:  100 mL of iohexol (OMNIPAQUE) Enteric Contrast:  Enteric contrast was not administered  FINDINGS: ABDOMEN LOWER CHEST:  There is a questionable filling defect in a right lower lobe segmental pulmonary artery (series 3 image 1, series 603 image 78), suspicious for a pulmonary embolus  LIVER/BILIARY TREE:  There are numerous (approximately 30-40) hypoattenuating liver lesions in the right and left hepatic lobes, suspicious for metastasis  Representative examples include: - Segment 7 lesion measuring 4 4 x 3 9 cm (series 3 image 14)  -Segment 5/6 lesion measuring 6 0 x 4 9 cm (series 3 image 20)  No biliary ductal dilation  GALLBLADDER:  No calcified gallstones  No pericholecystic inflammatory change  SPLEEN: Moderate sized Wedge-shaped area of hypoattenuation in the spleen (series 3 image 16, series 603 image 90), in keeping with a a splenic infarct   PANCREAS:  There is a hypoattenuating lesion in the pancreatic tail measuring 2 5 x 1 1 cm (series 3 image 31), suspicious for malignancy (pancreatic adenocarcinoma)  No main pancreatic ductal dilation  There is diminutive the splenic vein, suspicious for tumor involvement/thrombosis  ADRENAL GLANDS:  Mild nodular thickening of the left adrenal gland  The right adrenal gland is unremarkable  KIDNEYS/URETERS:  No hydroureteronephrosis  Scattered subcentimeter hypoattenuating lesions bilaterally that are too small to characterize, statistically most likely benign  STOMACH AND BOWEL:  Extensive colonic diverticulosis without findings of acute diverticulitis  No bowel obstruction  APPENDIX:  No findings to suggest appendicitis  ABDOMINOPELVIC CAVITY:  Trace perihepatic fluid  No free intraperitoneal air  No lymphadenopathy  VESSELS:  Atherosclerotic changes are present  No evidence of aneurysm  There is a diminutive splenic vein, suspicious for tumor involvement/thrombosis  Incidental note is made of a circumaortic left renal vein, normal variant  The SMA and celiac axis are patent  Apparent filling defects in the common femoral veins bilaterally, which may representative venous thrombosis or flow artifacts (series 3 image 82-83)  Correlation can be performed with lower extremity venous Doppler  PELVIS REPRODUCTIVE ORGANS:  Mild thickening of the endometrial complex measuring 8mm  No suspicious adnexal mass  URINARY BLADDER:  Moderately distended  Small focus of nondependent intravesical air (series 3 image 59), likely from recent catheterization/instrumentation  No focal mass  ABDOMINAL WALL/INGUINAL REGIONS:  Foci of subcutaneous air in the ventral abdominal wall, likely related to medication injection  OSSEOUS STRUCTURES:  No acute fracture or destructive osseous lesion  Degenerative changes of the thoracolumbar spine with mild dextroconvex scoliosis  Bilateral hip osteoarthritis  Impression: 1    Questionable filling defect in a right lower lobe segmental pulmonary artery, suspicious for pulmonary embolism  Further evaluation with dedicated chest CTA can be performed, if this would change patient management  2   2 5 cm hypoattenuating pancreatic tail lesion, suspicious for malignancy (pancreatic adenocarcinoma)  There is a diminutive splenic vein, suspicious for tumor involvement/thrombosis  No tumor involvement of the celiac axis, SMA or portal vein  3   Numerous liver lesions as described, suspicious for metastasis  4   Moderate sized splenic infarct  5   Mildly thickened endometrial complex measuring 8 mm  6  Apparent filling defects in the common femoral veins bilaterally, which may representative venous thrombosis or flow artifacts  Correlation with lower extremity venous Doppler can be performed  I personally discussed this study with Dr Norma Maldonado on 11/28/2019 at 12:07 PM  Workstation performed: [id number]        Medication Adjustments and Discharge Medications:  · Summary of Medication Adjustments made as a result of this hospitalization:  Comfort care, all medications stopped  · Medication Dosing Tapers - Please refer to Discharge Medication List for details on any medication dosing tapers (if applicable to patient)  · Discharge Medication List: See after visit summary for reconciled discharge medications  Wound Care Recommendations:  When applicable, please see wound care section of After Visit Summary  Diet Recommendations at Discharge:  Diet -        Diet Orders   (From admission, onward)             Start     Ordered    11/29/19 1401  Diet Dysphagia/Modified Consistency; Dysphagia 3-Dental Soft; Nectar Thick Liquid  Diet effective now     Question Answer Comment   Diet Type Dysphagia/Modified Consistency    Dysphagia/Modified Consistency Dysphagia 3-Dental Soft    Liquid Modifier Nectar Thick Liquid    RD to adjust diet per protocol?  Yes        11/29/19 1401                  Incidental Findings:   · Imaging finding of Pancreatic mass, liver metastasis, possible splenic vein metastasis, possible kidney metastasis    Test Results Pending at Discharge (will require follow up): · None         Hospital Course: Adrian Phillips is a 75 y o  female patient who originally presented to the hospital on 11/27/2019 due to patient found on the floor and was not responding to family members for prior to days before her admission  Patient was found to have multiple ischemic strokes in both supratentorial and infratentorial regions of the right hemisphere, hypercoagulable state with pulmonary embolism, bilateral femoral DVT  Workup after admission also revealed pancreatic tail mass with multiple liver lesions most likely metastatic lesions  This was discussed with patient and patient's family and patient decided for comfort care and no diagnostic or therapeutic interventions  During her presentation in the emergency room patient was also noticed to have severe hypoxia and cyanosis in both hands and feet  The cyanosis could be multifactorial probably due to peripheral vascular disease, emboli, prolonged hypoxia  Patient had no proper follow-up with his doctors and takes only aspirin and multivitamin  Active smoker  No known chronic illness  Patient and family agreed for comfort care and signed DNR/DNI/comfort advanced directive form  Patient was not able to sign the form due to cyanosis of her hands and unable to  the pen however patient was found to have capacity for medical decision  Swallow study done and showed patient is able to tolerate level 6 soft and bite size with level 2 mildly thick liquids  Aspiration precaution  Condition at Discharge: serious     Discharge Day Visit / Exam:     Subjective:  No complaints  No events overnight  Able to tolerate diet    Vitals: Blood Pressure: 120/80 (11/30/19 0826)  Pulse: 96 (11/30/19 0826)  Temperature: 98 1 °F (36 7 °C) (11/30/19 0826)  Temp Source: Oral (11/30/19 0826)  Respirations: 18 (11/30/19 0826)  Height: 5' 1" (154 9 cm) (11/29/19 3180)  Weight - Scale: 49 9 kg (110 lb 0 2 oz) (11/27/19 1500)  SpO2: 99 % (11/30/19 0826)  Exam:     General appearance: alert  Head: Normocephalic, without obvious abnormality, atraumatic  Lungs: clear to auscultation bilaterally  Heart: regular rate and rhythm  Abdomen: soft, non-tender, positive bowel sounds   Back: negative  Extremities: Cyanosis of both finger and toe tips  Neurologic: Mental status: alertness: alert      Discharge instructions/Information to patient and family:   See after visit summary section titled Discharge Instructions for information provided to patient and family  Planned Readmission:  No      Discharge Statement:  I spent 35 minutes discharging the patient  This time was spent on the day of discharge  I had direct contact with the patient on the day of discharge  Greater than 50% of the total time was spent examining patient, answering all patient questions, arranging and discussing plan of care with patient as well as directly providing post-discharge instructions  Additional time then spent on discharge activities      ** Please Note: This note has been constructed using a voice recognition system **

## 2019-11-30 NOTE — PROGRESS NOTES
Discussed case in rounds today admit with ischemic stroke and newly identified findings with new pancreatic mass and liver involvement  Patient very lethargic, but difficult to determine if she is understanding of findings  MD reported her sister was traveling from [place] today and they will discuss [address] with her other brother Jhony Junior  I met with MD, patient's sister, brother and NAE  Baseline information was obtained  CM discussed the role of CM in helping the patient develop a discharge plan and assist the patient in carry out their plan  11/29/19 [facility]   Patient Information   Mental Status Other (Comment)  (lethargic)   Primary Caregiver Self   Support System Immediate family   Activities of Daily Living Prior to Admission   Functional Status Independent   Assistive Device No device needed   Agip U  91  Pension/custodial       Patient resides alone, does not use any assisted devices prior to admission  Does have a good friend Kevmiles Francois  Decision Making falls to both the sister and brother, as patient does not have any children, no  and No POA  Sister Manish Lambert, jani RN is fully aware that patient does not have a POA, but fully aware of what Yandel Milan would want in this state  CM reviewed options with family: MD and family spoke to patient whom seemed to understand what was going  Patient wants to be comfortable  Family is 100% on board with this  We discussed Hospice Options with regards to needing 24/7 assistance for home hospice needs  It was family choice for CM to make a referral to [facility] as family is very familiar to this facility  Family call [facility] contact prior to my referral in [place]  CM spoke to LYDIA at [facility], they have accepted  LYDIA requested that I make contact with hospice agency, noting they contract with [facility] and [facility]  [facility] can accept tomorrow according to LYDIA CHACKO called sister Lolita Abbott to discuss dc : She was aware that patient will be discharge tomorrow and is aware that room/ board will not be covered  We discussed Hospice Agency - Her Choice is [facility]  Referral was made and I contacted the On Call nurse with [facility] to informed them of the referrral     [facility] has accepted and plan to meet patient at [facility] tomorrow: Informed [facility] of dc time was set up for 1100:  GINNA called  on at [facility] and informed her that [facility] will be seeing patient tomorrow  CM called and set up transport with [facility]  Transportation has been set up for 1100 with Carly Ochoa  Medical necessity form done and placed on chart with copy placed in bin to be scanned in  Patient's sister notified of discharge time tomorrow and she will notify her brother  Informed Bedside nurse and provided contact numbers for them to call Parale[facility] Ache tomorrow

## 2019-11-30 NOTE — SPEECH THERAPY NOTE
Video Swallow Study      Patient Name: Teo Caballero  [id number] Date: 11/29/2019        Past Medical History  Past Medical History:   Diagnosis Date    Acute renal failure (ARF) ([address] ) 11/27/2019    Arthritis         Past Surgical History  History reviewed  No pertinent surgical history  General Information:   74 y/o female who presented to ER with left sided body weakness and facial droop  MRI of brain yielded right MCA involving the right temporal and occipital lobs as well as the posterior insular cortex and right parietal cortex  Bedside swallow evaluation completed on 11/26/19 and presented with s/s of aspiration trials of puree and HTLs  Recommended NPO until completion of VFSS  Oral and Pharyngeal Stage:  Pt presented with mild oral and mild-mod pharyngeal dysphagia characterized by reduced oral agility, reduced hyolaryngeal elevation, delayed swallow initiation, and reduced airway protection  Puree trials x3 small and enlarged boluses yielded mild prolonged breakdown and formulation with pooling to the level of the vallecula, approximately 75% of bolus prior to swallow initiation  Absent pharyngeal stasis  Mech soft x2, soft solid x2, and regular trials x2 yielded prolonged mastication, trace diffuse oral stasis with no evidence of significant pocketing, pooling to the level of the vallecula, delayed swallow initiation with trace vallecular and pharyngeal wall retention, cleared with independent vs cued dry swallow  HTLs x3 and NTLs x3 via spoon and cup yielded delayed swallow to the vallecula with adequate airway protection  Attempted NTLs via straw, unsuccessful  Thin via spoon x1 and cup x4 yielded poor bolus control with rapid premature transfer and spillage to the pyriform sinuses with 2 instances of deep penetration to the level of the vocal folds and 1 episode of aspiration, + cough response   Pharyngeal wall retention on all thin trials, cleared with double swallow  Attempted chin tuck however pt unable to complete successfully with manual assistance  Recommendations: Initiation of level 6 soft and bite size with level 2 mildly thick liquids  Aspiration precautions

## 2019-11-30 NOTE — SPEECH THERAPY NOTE
Speech Language/Pathology    VFSS scheduled for this date  Reviewed with pt rationale for completion and process  Assisted radiology in transport  Post completion of study, reviewed with pt and brother swallowing mechanisms and associated functions, tolerance of trials given within study and recommendations for initiation of diet: level 6 soft bite size and level 2 liquids slightly thick  Family and pt pleased with results and excited for initiation of diet  Conversed with RN later in evening regarding intake and tolerance  Reported extreme fatigue and did not consume dinner due to same  Plan to d/c next date to ECF if able  Initiated comfort measures due to concomitant dxs

## 2023-05-14 NOTE — ASSESSMENT & PLAN NOTE
Patient on the ground for unknown duration  CK levels showing improvement    Follow CK level, serum electrolytes, renal function  Continue normal saline at 125 ml/hr Private car

## 2023-12-05 NOTE — PLAN OF CARE
Problem: Potential for Falls  Goal: Patient will remain free of falls  Description  INTERVENTIONS:  - Assess patient frequently for physical needs  -  Identify cognitive and physical deficits and behaviors that affect risk of falls  -  [place] fall precautions as indicated by assessment   - Educate patient/family on patient safety including physical limitations  - Instruct patient to call for assistance with activity based on assessment  - Modify environment to reduce risk of injury  - Consider OT/PT consult to assist with strengthening/mobility  Outcome: Progressing     Problem: Nutrition/Hydration-ADULT  Goal: Nutrient/Hydration intake appropriate for improving, restoring or maintaining nutritional needs  Description  Monitor and assess patient's nutrition/hydration status for malnutrition  Collaborate with interdisciplinary team and initiate plan and interventions as ordered  Monitor patient's weight and dietary intake as ordered or per policy  Utilize nutrition screening tool and intervene as necessary  Determine patient's food preferences and provide high-protein, high-caloric foods as appropriate       INTERVENTIONS:  - Monitor oral intake, urinary output, labs, and treatment plans  - Assess nutrition and hydration status and recommend course of action  - Evaluate amount of meals eaten  - Assist patient with eating if necessary   - Allow adequate time for meals  - Recommend/ encourage appropriate diets, oral nutritional supplements, and vitamin/mineral supplements  - Order, calculate, and assess calorie counts as needed  - Recommend, monitor, and adjust tube feedings and TPN/PPN based on assessed needs  - Assess need for intravenous fluids  - Provide specific nutrition/hydration education as appropriate  - Include patient/family/caregiver in decisions related to nutrition  Outcome: Progressing     Problem: Neurological Deficit  Goal: Neurological status is stable or improving  Description  Interventions:  - Monitor and assess patient's level of consciousness, motor function, sensory function, and level of assistance needed for ADLs  - Monitor and report changes from baseline  Collaborate with interdisciplinary team to initiate plan and implement interventions as ordered  - Provide and maintain a safe environment  - Consider seizure precautions  - Consider fall precautions  - Consider aspiration precautions  - Consider bleeding precautions  Outcome: Progressing     Problem: Activity Intolerance/Impaired Mobility  Goal: Mobility/activity is maintained at optimum level for patient  Description  Interventions:  - Assess and monitor patient  barriers to mobility and need for assistive/adaptive devices  - Assess patient's emotional response to limitations  - Collaborate with interdisciplinary team and initiate plans and interventions as ordered  - Encourage independent activity per ability   - Maintain proper body alignment  - Perform active/passive rom as tolerated/ordered  - Plan activities to conserve energy   - Turn patient as appropriate  Outcome: Progressing     Problem: Communication Impairment  Goal: Ability to express needs and understand communication  Description  Assess patient's communication skills and ability to understand information  Patient will demonstrate use of effective communication techniques, alternative methods of communication and understanding even if not able to speak  - Encourage communication and provide alternate methods of communication as needed  - Collaborate with case management/ for discharge needs  - Include patient/family/caregiver in decisions related to communication  Outcome: Progressing     Problem: Potential for Aspiration  Goal: Non-ventilated patient's risk of aspiration is minimized  Description  Assess and monitor vital signs, respiratory status, and labs (WBC)    Monitor for signs of aspiration (tachypnea, cough, rales, wheezing, cyanosis, fever)  - Assess and monitor patient's ability to swallow  - Place patient up in chair to eat if possible  - HOB up at 90 degrees to eat if unable to get patient up into chair   - Supervise patient during oral intake  - Instruct patient/ family to take small bites  - Instruct patient/ family to take small single sips when taking liquids  - Follow patient-specific strategies generated by speech pathologist   Outcome: Progressing  Goal: Ventilated patient's risk of aspiration is minimized  Description  Assess and monitor vital signs, respiratory status, airway cuff pressure, and labs (WBC)  Monitor for signs of aspiration (tachypnea, cough, rales, wheezing, cyanosis, fever)  - Elevate head of bed 30 degrees if patient has tube feeding   - Monitor tube feeding  Outcome: Progressing     Problem: Nutrition  Goal: Nutrition/Hydration status is improving  Description  Monitor and assess patient's nutrition/hydration status for malnutrition (ex- brittle hair, bruises, dry skin, pale skin and conjunctiva, muscle wasting, smooth red tongue, and disorientation)  Collaborate with interdisciplinary team and initiate plan and interventions as ordered  Monitor patient's weight and dietary intake as ordered or per policy  Utilize nutrition screening tool and intervene per policy  Determine patient's food preferences and provide high-protein, high-caloric foods as appropriate  - Assist patient with eating   - Allow adequate time for meals   - Encourage patient to take dietary supplement as ordered  - Collaborate with clinical nutritionist   - Include patient/family/caregiver in decisions related to nutrition    Outcome: Progressing     Problem: NEUROSENSORY - ADULT  Goal: Achieves stable or improved neurological status  Description  INTERVENTIONS  - Monitor and report changes in neurological status  - Monitor vital signs such as temperature, blood pressure, glucose, and any other labs ordered   - Initiate measures to prevent increased intracranial pressure  - Monitor for seizure activity and implement precautions if appropriate      Outcome: Progressing  Goal: Remains free of injury related to seizures activity  Description  INTERVENTIONS  - Maintain airway, patient safety  and administer oxygen as ordered  - Monitor patient for seizure activity, document and report duration and description of seizure to physician/advanced practitioner  - If seizure occurs,  ensure patient safety during seizure  - Reorient patient post seizure  - Seizure pads on all 4 side rails  - Instruct patient/family to notify RN of any seizure activity including if an aura is experienced  - Instruct patient/family to call for assistance with activity based on nursing assessment  - Administer anti-seizure medications if ordered    Outcome: Progressing  Goal: Achieves maximal functionality and self care  Description  INTERVENTIONS  - Monitor swallowing and airway patency with patient fatigue and changes in neurological status  - Encourage and assist patient to increase activity and self care     - Encourage visually impaired, hearing impaired and aphasic patients to use assistive/communication devices  Outcome: Progressing     Problem: SKIN/TISSUE INTEGRITY - ADULT  Goal: Skin integrity remains intact  Description  INTERVENTIONS  - Identify patients at risk for skin breakdown  - Assess and monitor skin integrity  - Assess and monitor nutrition and hydration status  - Monitor labs (i e  albumin)  - Assess for incontinence   - Turn and reposition patient  - Assist with mobility/ambulation  - Relieve pressure over bony prominences  - Avoid friction and shearing  - Provide appropriate hygiene as needed including keeping skin clean and dry  - Evaluate need for skin moisturizer/barrier cream  - Collaborate with interdisciplinary team (i e  Nutrition, Rehabilitation, etc )   - Patient/family teaching  Outcome: Progressing  Goal: Incision(s), wounds(s) or drain site(s) healing without S/S of infection  Description  INTERVENTIONS  - Assess and document risk factors for skin impairment   - Assess and document dressing, incision, wound bed, drain sites and surrounding tissue  - Consider nutrition services referral as needed  - Oral mucous membranes remain intact  - Provide patient/ family education  Outcome: Progressing  Goal: Oral mucous membranes remain intact  Description  INTERVENTIONS  - Assess oral mucosa and hygiene practices  - Implement preventative oral hygiene regimen  - Implement oral medicated treatments as ordered  - Initiate Nutrition services referral as needed  Outcome: Progressing WFL

## 2024-10-22 NOTE — TRANSPORTATION MEDICAL NECESSITY
Section I - General Information    Name of Patient: Katharina Escobedo                 : 1955    Medicare #: [id number]  Transport Date: 19 (PCS is valid for round trips on this date and for all repetitive trips in the 60-day range as noted below )  Origin: [address] [place] [address] [place]:  [facility]  Is the pt's stay covered under Medicare Part A (PPS/DRG)   [x]     Closest appropriate facility? If no, why is transport to more distant facility required? Yes  If hospice pt, is this transport related to pt's terminal illness? Yes       Section II - Medical Necessity Questionnaire  Ambulance transportation is medically necessary only if other means of transport are contraindicated or would be potentially harmful to the patient  To meet this requirement, the patient must either be "bed confined" or suffer from a condition such that transport by means other than ambulance is contraindicated by the patient's condition  The following questions must be answered by the medical professional signing below for this form to be valid:    1)  Describe the MEDICAL CONDITION (physical and/or mental) of this patient AT [address] [place] [address] that requires the patient to be transported in an ambulance and why transport by other means is contraindicated by the patient's condition: ischemic stroke unable to ambulate    2) Is the patient "bed confined" as defined below? Yes  To be "be confined" the patient must satisfy all three of the following conditions: (1) unable to get up from bed without Assistance; AND (2) unable to ambulate; AND (3) unable to sit in a chair or wheelchair  3) Can this patient safely be transported by car or wheelchair van (i e , seated during transport without a medical attendant or monitoring)?    No    4) In addition to completing questions 1-3 above, please check any of the following conditions that apply*:   *Note: supporting documentation for any boxes checked must be maintained in the patient's medical records  If hosp-hosp transfer, describe services needed at 2nd facility not available at 1st facility? Medical attendant required   Unable to tolerate seated position for time needed to transport    Decrease mental status related to ischemic stroke, very lethargic, unable to sit in a wheelchair  Section III - Signature of Physician or Healthcare Professional  I certify that the above information is true and correct based on my evaluation of this patient, and represent that the patient requires transport by ambulance and that other forms of transport are contraindicated  I understand that this information will be used by the Centers for Medicare and Medicaid Services (CMS) to support the determination of medical necessity for ambulance services, and I represent that I have personal knowledge of the patient's condition at time of transport  []  If this box is checked, I also certify that the patient is physically or mentally incapable of signing the ambulance service's claim and that the institution with which I am affiliated has furnished care, services, or assistance to the patient  My signature below is made on behalf of the patient pursuant to 42 CFR §424 36(b)(4)   In accordance with 42 CFR §424 37, the specific reason(s) that the patient is physically or mentally incapable of signing the claim form is as follows: NA      Signature of Physician* or Healthcare Professional ______________________________________________________________  Signature Date 11/29/19 (For scheduled repetitive transports, this form is not valid for transports performed more than 60 days after this date)    Printed Name & Credentials of Physician or Healthcare Professional (MD, DO, RN, etc )_Lissa Miller RN_____________________________  *Form must be signed by patient's attending physician for scheduled, repetitive transports   For non-repetitive, unscheduled ambulance transports, if unable to obtain the signature of the attending physician, any of the following may sign (choose appropriate option below)  [] Physician Assistant []  Clinical Nurse Specialist [x]  Registered Nurse  []  Nurse Practitioner  [] Discharge Planner No